# Patient Record
Sex: MALE | Race: BLACK OR AFRICAN AMERICAN | Employment: OTHER | URBAN - METROPOLITAN AREA
[De-identification: names, ages, dates, MRNs, and addresses within clinical notes are randomized per-mention and may not be internally consistent; named-entity substitution may affect disease eponyms.]

---

## 2017-01-04 ENCOUNTER — HOSPITAL ENCOUNTER (OUTPATIENT)
Dept: CT IMAGING | Age: 67
Discharge: HOME | End: 2017-01-04
Attending: FAMILY MEDICINE
Payer: COMMERCIAL

## 2017-01-04 DIAGNOSIS — G45.0 TIA INVOLVING VERTEBRAL ARTERY: ICD-10-CM

## 2017-01-04 PROCEDURE — 70450 CT HEAD/BRAIN W/O DYE: CPT

## 2017-08-24 ENCOUNTER — TELEPHONE (OUTPATIENT)
Dept: MRI IMAGING | Age: 67
End: 2017-08-24

## 2017-10-24 ENCOUNTER — ANESTHESIA EVENT (OUTPATIENT)
Dept: SURGERY | Age: 67
End: 2017-10-24
Payer: COMMERCIAL

## 2017-10-24 NOTE — PERIOP NOTES
[facility]  Ambulatory Surgery Unit  Pre-operative Instructions for Endo Procedures    Procedure Date  10/25/17            Tentative Arrival Time 0800      1. On the day of your procedure, please report to the Ambulatory Surgery Unit Registration Desk and sign in at your designated time. The Ambulatory Surgery Unit is located in [facility] on the [facility] side of the [facility] across from the [facility]. Please have all of your health insurance cards and a photo ID. 2. You must have someone with you to drive you home, as you should not drive a car for 24 hours following anesthesia. Please make arrangements for a responsible adult friend or family member to stay with you for at least the first 24 hours after your procedure. 3. Do not have anything to eat or drink (including water, gum, mints, coffee, juice) after midnight   10/24/17. This may not apply to medications prescribed by your physician. (Please note below the special instructions with medications to take the morning of your procedure.)    4. If applicable, follow the clear liquid diet and bowel prep instructions provided by your physician's office. If you do not have this information, or have any questions, please contact your physician's office. 5. We recommend you do not drink any alcoholic beverages for 24 hours before and after your procedure. 6. Contact your surgeons office for instructions on the following medications: non-steroidal anti-inflammatory drugs (i.e. Advil, Aleve), vitamins, and supplements. (Some surgeons will want you to stop these medications prior to surgery and others may allow you to take them)   **If you are currently taking Plavix, Coumadin, Aspirin and/or other blood-thinning agents, contact your surgeon for instructions. ** Your surgeon will partner with the physician prescribing these medications to determine if it is safe to stop or if you need to continue taking.  Please do not stop taking these medications without instructions from your surgeon. 7. In an effort to help prevent surgical site infection, we ask that you shower with an anti-bacterial soap (i.e. Dial or Safeguard) on the morning of your procedure. Do not apply any lotions, powders, or deodorants after showering. 8. Wear comfortable clothes. Wear glasses instead of contacts. Do not bring any jewelry or money (other than copays or fees as instructed). Do not wear make-up, particularly mascara, the morning of your procedure. Wear your hair loose or down, no ponytails, buns, anup pins or clips. All body piercings must be removed. 9. You should understand that if you do not follow these instructions your procedure may be cancelled. If your physical condition changes (i.e. fever, cold or flu) please contact your surgeon as soon as possible. 10. It is important that you be on time. If a situation occurs where you may be late, or if you have any questions or problems, please call [phone]. 11. Your procedure time may be subject to change. You will receive a phone call the day prior to confirm your arrival time. Special Instructions: Take all medications and inhalers, as prescribed, on the morning of surgery with a sip of water EXCEPT: none      I understand a pre-operative phone call will be made to verify my procedure time. In the event that I am not available, I give permission for a message to be left on my answering service and/or with another person?       Yes     (instructions given verbally during phone assessment- pt voiced understanding)     ___________________      ___________________      ___________________  (Signature of Patient)          (Witness)                   (Date and Time)

## 2017-10-25 ENCOUNTER — ANESTHESIA (OUTPATIENT)
Dept: SURGERY | Age: 67
End: 2017-10-25
Payer: COMMERCIAL

## 2017-10-25 ENCOUNTER — HOSPITAL ENCOUNTER (OUTPATIENT)
Age: 67
Setting detail: OUTPATIENT SURGERY
Discharge: HOME | End: 2017-10-25
Attending: COLON & RECTAL SURGERY | Admitting: COLON & RECTAL SURGERY
Payer: COMMERCIAL

## 2017-10-25 VITALS
SYSTOLIC BLOOD PRESSURE: 124 MMHG | TEMPERATURE: 97.8 F | OXYGEN SATURATION: 97 % | BODY MASS INDEX: 24.64 KG/M2 | HEART RATE: 81 BPM | HEIGHT: 71 IN | DIASTOLIC BLOOD PRESSURE: 86 MMHG | WEIGHT: 176 LBS | RESPIRATION RATE: 25 BRPM

## 2017-10-25 PROBLEM — Z12.11 ENCOUNTER FOR SCREENING COLONOSCOPY: Status: ACTIVE | Noted: 2017-10-25

## 2017-10-25 PROCEDURE — 74011000250 HC RX REV CODE- 250

## 2017-10-25 PROCEDURE — 77030009426 HC FCPS BIOP ENDOSC BSC -B: Performed by: COLON & RECTAL SURGERY

## 2017-10-25 PROCEDURE — 76210000040 HC AMBSU PH I REC FIRST 0.5 HR: Performed by: COLON & RECTAL SURGERY

## 2017-10-25 PROCEDURE — 88305 TISSUE EXAM BY PATHOLOGIST: CPT | Performed by: COLON & RECTAL SURGERY

## 2017-10-25 PROCEDURE — 77030020255 HC SOL INJ LR 1000ML BG: Performed by: COLON & RECTAL SURGERY

## 2017-10-25 PROCEDURE — 76030000002 HC AMB SURG OR TIME FIRST 0.: Performed by: COLON & RECTAL SURGERY

## 2017-10-25 PROCEDURE — 76210000046 HC AMBSU PH II REC FIRST 0.5 HR: Performed by: COLON & RECTAL SURGERY

## 2017-10-25 PROCEDURE — 74011250636 HC RX REV CODE- 250/636

## 2017-10-25 PROCEDURE — 76060000073 HC AMB SURG ANES FIRST 0.5 HR: Performed by: COLON & RECTAL SURGERY

## 2017-10-25 PROCEDURE — 74011250636 HC RX REV CODE- 250/636: Performed by: ANESTHESIOLOGY

## 2017-10-25 RX ORDER — SODIUM CHLORIDE, SODIUM LACTATE, POTASSIUM CHLORIDE, CALCIUM CHLORIDE 600; 310; 30; 20 MG/100ML; MG/100ML; MG/100ML; MG/100ML
25 INJECTION, SOLUTION INTRAVENOUS CONTINUOUS
Status: DISCONTINUED | OUTPATIENT
Start: 2017-10-25 | End: 2017-10-25 | Stop reason: HOSPADM

## 2017-10-25 RX ORDER — DIPHENHYDRAMINE HYDROCHLORIDE 50 MG/ML
12.5 INJECTION, SOLUTION INTRAMUSCULAR; INTRAVENOUS AS NEEDED
Status: DISCONTINUED | OUTPATIENT
Start: 2017-10-25 | End: 2017-10-25 | Stop reason: HOSPADM

## 2017-10-25 RX ORDER — FENTANYL CITRATE 50 UG/ML
25 INJECTION, SOLUTION INTRAMUSCULAR; INTRAVENOUS
Status: DISCONTINUED | OUTPATIENT
Start: 2017-10-25 | End: 2017-10-25 | Stop reason: HOSPADM

## 2017-10-25 RX ORDER — PROPOFOL 10 MG/ML
INJECTION, EMULSION INTRAVENOUS AS NEEDED
Status: DISCONTINUED | OUTPATIENT
Start: 2017-10-25 | End: 2017-10-25 | Stop reason: HOSPADM

## 2017-10-25 RX ORDER — SODIUM CHLORIDE 0.9 % (FLUSH) 0.9 %
5-10 SYRINGE (ML) INJECTION AS NEEDED
Status: DISCONTINUED | OUTPATIENT
Start: 2017-10-25 | End: 2017-10-25 | Stop reason: HOSPADM

## 2017-10-25 RX ORDER — LIDOCAINE HYDROCHLORIDE 10 MG/ML
0.1 INJECTION, SOLUTION EPIDURAL; INFILTRATION; INTRACAUDAL; PERINEURAL AS NEEDED
Status: DISCONTINUED | OUTPATIENT
Start: 2017-10-25 | End: 2017-10-25 | Stop reason: HOSPADM

## 2017-10-25 RX ORDER — ONDANSETRON 2 MG/ML
4 INJECTION INTRAMUSCULAR; INTRAVENOUS AS NEEDED
Status: DISCONTINUED | OUTPATIENT
Start: 2017-10-25 | End: 2017-10-25 | Stop reason: HOSPADM

## 2017-10-25 RX ORDER — SODIUM CHLORIDE 0.9 % (FLUSH) 0.9 %
5-10 SYRINGE (ML) INJECTION EVERY 8 HOURS
Status: DISCONTINUED | OUTPATIENT
Start: 2017-10-25 | End: 2017-10-25 | Stop reason: HOSPADM

## 2017-10-25 RX ORDER — LIDOCAINE HYDROCHLORIDE 20 MG/ML
INJECTION, SOLUTION EPIDURAL; INFILTRATION; INTRACAUDAL; PERINEURAL AS NEEDED
Status: DISCONTINUED | OUTPATIENT
Start: 2017-10-25 | End: 2017-10-25 | Stop reason: HOSPADM

## 2017-10-25 RX ADMIN — PROPOFOL 20 MG: 10 INJECTION, EMULSION INTRAVENOUS at 09:39

## 2017-10-25 RX ADMIN — PROPOFOL 40 MG: 10 INJECTION, EMULSION INTRAVENOUS at 09:23

## 2017-10-25 RX ADMIN — SODIUM CHLORIDE, SODIUM LACTATE, POTASSIUM CHLORIDE, AND CALCIUM CHLORIDE 25 ML/HR: 600; 310; 30; 20 INJECTION, SOLUTION INTRAVENOUS at 08:45

## 2017-10-25 RX ADMIN — PROPOFOL 20 MG: 10 INJECTION, EMULSION INTRAVENOUS at 09:35

## 2017-10-25 RX ADMIN — PROPOFOL 20 MG: 10 INJECTION, EMULSION INTRAVENOUS at 09:32

## 2017-10-25 RX ADMIN — PROPOFOL 40 MG: 10 INJECTION, EMULSION INTRAVENOUS at 09:24

## 2017-10-25 RX ADMIN — LIDOCAINE HYDROCHLORIDE 40 MG: 20 INJECTION, SOLUTION EPIDURAL; INFILTRATION; INTRACAUDAL; PERINEURAL at 09:23

## 2017-10-25 RX ADMIN — PROPOFOL 20 MG: 10 INJECTION, EMULSION INTRAVENOUS at 09:29

## 2017-10-25 RX ADMIN — PROPOFOL 20 MG: 10 INJECTION, EMULSION INTRAVENOUS at 09:26

## 2017-10-25 NOTE — PERIOP NOTES
Endoscope was pre-cleaned at bedside immediately by Clifford Umana.          Patient: Ina Apley, Dr. MRN: [id number]  SSN: [id number]   YOB: 1950  Age: 69 y.o. Sex: male     Patient is status post Procedure(s):  COLONOSCOPY  COLON BIOPSY. Surgeon(s) and Role:     * Jake Brown MD - Primary                  Peripheral IV 10/25/17 Right Forearm (Active)   Site Assessment Clean, dry, & intact 10/25/2017  8:42 AM   Phlebitis Assessment 0 10/25/2017  8:42 AM   Infiltration Assessment 0 10/25/2017  8:42 AM   Dressing Status Clean, dry, & intact 10/25/2017  8:42 AM   Dressing Type Tape;Transparent 10/25/2017  8:42 AM   Hub Color/Line Status Blue; Infusing 10/25/2017  8:42 AM

## 2017-10-25 NOTE — ANESTHESIA POSTPROCEDURE EVALUATION
Post-Anesthesia Evaluation and Assessment    Patient: Pandora Fleischer, Dr. MRN: [id number]  SSN: [id number]    YOB: 1950  Age: 69 y.o. Sex: male       Cardiovascular Function/Vital Signs  Visit Vitals    /86    Pulse 81    Temp 36.6 °C (97.8 °F)    Resp 25    Ht 5' 10.5\" (1.791 m)    Wt 79.8 kg (176 lb)    SpO2 97%    BMI 24.9 kg/m2       Patient is status post general, total IV anesthesia anesthesia for Procedure(s):  COLONOSCOPY  COLON BIOPSY. Nausea/Vomiting: None    Postoperative hydration reviewed and adequate. Pain:  Pain Scale 1: Numeric (0 - 10) (10/25/17 1001)  Pain Intensity 1: 0 (10/25/17 1001)   Managed    Neurological Status:   Neuro (WDL): Within Defined Limits (10/25/17 0946)   At baseline    Mental Status and Level of Consciousness: Arousable    Pulmonary Status:   O2 Device: Room air (10/25/17 0947)   Adequate oxygenation and airway patent    Complications related to anesthesia: None    Post-anesthesia assessment completed.  No concerns    Signed By: Cori Pal MD     October 25, 2017

## 2017-10-25 NOTE — DISCHARGE INSTRUCTIONS
Diane Ortiz Dr.  [id number]  1950    COLON DISCHARGE INSTRUCTIONS  Discomfort:  Redness at IV site- apply warm compress to area; if redness or soreness persist- contact your physician  There may be a slight amount of blood passed from the rectum  Gaseous discomfort- walking, belching will help relieve any discomfort  You may not operate a vehicle for 12 hours  You may not engage in an occupation involving machinery or appliances for rest of today  You may not drink alcoholic beverages for at least 12 hours  Avoid making any critical decisions for at least 24 hour  DIET:   Regular diet. - however -  remember your colon is empty and a heavy meal will produce gas. Avoid these foods:  vegetables, fried / greasy foods, carbonated drinks for today     ACTIVITY:  You may not  resume your normal daily activities it is recommended that you spend the remainder of the day resting -  avoid any strenuous activity. CALL M.D. ANY SIGN OF:   Increasing pain, nausea, vomiting  Abdominal distension (swelling)  New increased bleeding (oral or rectal)  Fever (chills)  Pain in chest area  Bloody discharge from nose or mouth  Shortness of breath    You may  take any Advil, Aspirin, Ibuprofen, Motrin, Aleve, or Goodys or  Tylenol as needed for pain. Post procedure diagnosis:  MILD SIGMOID STRICTURE, TORRES COLITIS  Follow-up Instructions:   Call Dr. Barbara Chatman if any questions. Telephone #  [phone]. Additional instructions ;  followup c-scope in 3 years. DO NOT TAKE SLEEPING MEDICATIONS OR ANTIANXIETY MEDICATIONS WHILE TAKING NARCOTIC PAIN MEDICATIONS,  ESPECIALLY THE NIGHT OF ANESTHESIA. CPAP PATIENTS BE SURE TO WEAR MACHINE WHENEVER NAPPING OR SLEEPING.     DISCHARGE SUMMARY from Nurse    The following personal items collected during your admission are returned to you:   Dental Appliance: Dental Appliances: None  Vision: Visual Aid: Contacts (pt. will remove)  Hearing Aid:    Jewelry:    Clothing: Other Valuables:    Valuables sent to safe:        PATIENT INSTRUCTIONS:    After General Anesthesia or Intravenous Sedation, for 24 hours or while taking prescription Narcotics:        Someone should be with you for the next 24 hours. For your own safety, a responsible adult must drive you home. · Limit your activities  · Recommended activity: Rest today, up with assistance today. Do not climb stairs or shower unattended for the next 24 hours. · Please start with a soft bland diet and advance as tolerated (no nausea) to regular diet. · If you have a sore throat you should try the following: fluids, warm salt water gargles, or throat lozenges. If it does not improve after several days please follow up with your primary physician. · Do not drive and operate hazardous machinery  · Do not make important personal or business decisions  · Do  not drink alcoholic beverages  · If you have not urinated within 8 hours after discharge, please contact your surgeon on call. Report the following to your surgeon:  · Excessive pain, swelling, redness or odor of or around the surgical area  · Temperature over 100.5  · Nausea and vomiting lasting longer than 4 hours or if unable to take medications  · Any signs of decreased circulation or nerve impairment to extremity: change in color, persistent  numbness, tingling, coldness or increase pain      · You will receive a Post Operative Call from one of the Recovery Room Nurses on the day after your surgery to check on you. It is very important for us to know how you are recovering after your surgery. If you have an issue or need to speak with someone, please call your surgeon, do not wait for the post operative call. · You may receive an e-mail or letter in the mail from [place] regarding your experience with us in the Ambulatory Surgery Unit. Your feedback is valuable to us and we appreciate your participation in the survey.        · If the above instructions are not adequate, please contact Abhilash Flores RN, Terri anesthesia Nurse Manager or our Anesthesiologist, at [phone]. If you are having problems after your surgery, call the physician at his office number. · We wish you a speedy recovery ? What to do at Home:      *  Please give a list of your current medications to your Primary Care Provider. *  Please update this list whenever your medications are discontinued, doses are      changed, or new medications (including over-the-counter products) are added. *  Please carry medication information at all times in case of emergency situations. These are general instructions for a healthy lifestyle:    No smoking/ No tobacco products/ Avoid exposure to second hand smoke    Surgeon General's Warning:  Quitting smoking now greatly reduces serious risk to your health. Obesity, smoking, and sedentary lifestyle greatly increases your risk for illness    A healthy diet, regular physical exercise & weight monitoring are important for maintaining a healthy lifestyle    You may be retaining fluid if you have a history of heart failure or if you experience any of the following symptoms:  Weight gain of 3 pounds or more overnight or 5 pounds in a week, increased swelling in our hands or feet or shortness of breath while lying flat in bed. Please call your doctor as soon as you notice any of these symptoms; do not wait until your next office visit. Recognize signs and symptoms of STROKE:    B - Balance  E - Eyes    F-  Face looks uneven    A-  Arms unable to move or move even    S-  Speech slurred or non-existent    T-  Time-call 911 as soon as signs and symptoms begin-DO NOT go       Back to bed or wait to see if you get better-TIME IS BRAIN. If you have not received your influenza and/or pneumococcal vaccine, please follow up with your primary care physician. The discharge information has been reviewed with the patient and spouse.   The patient and spouse verbalized understanding.

## 2017-10-25 NOTE — IP AVS SNAPSHOT
[address] [facility] 
[phone] Patient: Hilda Castañeda Dr. 
MRN: [id number] MKU:[id number]/[id number] About your hospitalization You were admitted on:  October 25, 2017 You last received care in the:  [facility] PACU You were discharged on:  October 25, 2017 Why you were hospitalized Your primary diagnosis was:  Encounter For Screening Colonoscopy Things You Need To Do (next 8 weeks) Follow up with Meghana Kaur MD  
  
Phone:  [phone] Where:  [address], [address] [id number] Discharge Orders None A check raúl indicates which time of day the medication should be taken. My Medications TAKE these medications as instructed Instructions Each Dose to Equal  
 Morning Noon Evening Bedtime  
 acyclovir 400 mg tablet Commonly known as:  ZOVIRAX Your last dose was: Your next dose is: Take 400 mg by mouth three (3) times daily as needed. 400 mg  
    
   
   
   
  
 amLODIPine 5 mg tablet Commonly known as:  Terri Radha Your last dose was: Your next dose is: Take 5 mg by mouth every morning. Indications: HYPERTENSION  
 5 mg  
    
   
   
   
  
 ferrous sulfate 325 mg (65 mg iron) tablet Your last dose was: Your next dose is: Take  by mouth Daily (before breakfast). omeprazole 20 mg capsule Commonly known as:  PRILOSEC Your last dose was: Your next dose is: Take 20 mg by mouth two (2) times a day. 20 mg  
    
   
   
   
  
 SAW PALMETTO EXTRACT PO Your last dose was: Your next dose is: Take  by mouth daily. sildenafil 20 mg tablet Commonly known as:  REVATIO Your last dose was: Your next dose is: Take 20 mg by mouth as needed.   
 20 mg  
    
   
 TYLENOL EXTRA STRENGTH 500 mg tablet Generic drug:  acetaminophen Your last dose was: Your next dose is: Take  by mouth every six (6) hours as needed for Pain. VITAMIN C 500 mg tablet Generic drug:  ascorbic acid (vitamin C) Your last dose was: Your next dose is: Take 1,000 mg by mouth daily. 1000 mg Discharge Instructions Dawit Perez Dr. 
[id number] 
1950 COLON DISCHARGE INSTRUCTIONS Discomfort: 
Redness at IV site- apply warm compress to area; if redness or soreness persist- contact your physician There may be a slight amount of blood passed from the rectum Gaseous discomfort- walking, belching will help relieve any discomfort You may not operate a vehicle for 12 hours You may not engage in an occupation involving machinery or appliances for rest of today You may not drink alcoholic beverages for at least 12 hours Avoid making any critical decisions for at least 24 hour DIET: 
 Regular diet.  however -  remember your colon is empty and a heavy meal will produce gas. Avoid these foods:  vegetables, fried / greasy foods, carbonated drinks for today ACTIVITY: 
You may not  resume your normal daily activities it is recommended that you spend the remainder of the day resting -  avoid any strenuous activity. CALL M.D. ANY SIGN OF: Increasing pain, nausea, vomiting Abdominal distension (swelling) New increased bleeding (oral or rectal) Fever (chills) Pain in chest area Bloody discharge from nose or mouth Shortness of breath You may  take any Advil, Aspirin, Ibuprofen, Motrin, Aleve, or Goodys or  Tylenol as needed for pain. Post procedure diagnosis:  MILD SIGMOID STRICTURE, TORRES COLITIS Follow-up Instructions: 
 Call Dr. Giulia Quinn if any questions. Telephone #  [phone]. Additional instructions ;  followup c-scope in 3 years. DO NOT TAKE SLEEPING MEDICATIONS OR ANTIANXIETY MEDICATIONS WHILE TAKING NARCOTIC PAIN MEDICATIONS,  ESPECIALLY THE NIGHT OF ANESTHESIA. CPAP PATIENTS BE SURE TO WEAR MACHINE WHENEVER NAPPING OR SLEEPING. DISCHARGE SUMMARY from Nurse The following personal items collected during your admission are returned to you:  
Dental Appliance: Dental Appliances: None Vision: Visual Aid: Contacts (pt. will remove) Hearing Aid:   
Jewelry:   
Clothing:   
Other Valuables:   
Valuables sent to safe:   
 
 
PATIENT INSTRUCTIONS: 
 
 
B - Balance E - Eyes F-  Face looks uneven A-  Arms unable to move or move even S-  Speech slurred or non-existent T-  Time-call 911 as soon as signs and symptoms begin-DO NOT go Back to bed or wait to see if you get better-TIME IS BRAIN. If you have not received your influenza and/or pneumococcal vaccine, please follow up with your primary care physician. The discharge information has been reviewed with the {PATIENT PARENT GUARDIAN:03790}. The {PATIENT PARENT GUARDIAN:87828} verbalized understanding. Introducing [facility]! Dear Judy Houston: Thank you for requesting a [employer] account. Our records indicate that you already have an active [employer] account. You can access your account anytime at [email or web address] Did you know that you can access your hospital and ER discharge instructions at any time in [employer]? You can also review all of your test results from your hospital stay or ER visit. Additional Information If you have questions, please visit the Frequently Asked Questions section of the [employer] website at [email or web address] Remember, [employer] is NOT to be used for urgent needs. For medical emergencies, dial 911. Now available from your iPhone and Android! Providers Seen During Your Hospitalization Provider Specialty Primary office phone Mary Vo MD Colon and Rectal Surgery [phone] Your Primary Care Physician (PCP) Primary Care Physician Office Phone Office Fax Monse Bartholomew [phone] You are allergic to the following Allergen Reactions Shellfish Derived Anaphylaxis Recent Documentation Height Weight BMI Smoking Status 1.791 m 79.8 kg 24.9 kg/m2 Never Smoker Emergency Contacts Name Discharge Info Relation Home Work Mobile Justa,Crystal DISCHARGE CAREGIVER [3] Spouse [3]   [phone] Patient Belongings The following personal items are in your possession at time of discharge: 
  Dental Appliances: None  Visual Aid: Contacts (pt. will remove) Please provide this summary of care documentation to your next provider. Signatures-by signing, you are acknowledging that this After Visit Summary has been reviewed with you and you have received a copy. Patient Signature:  ____________________________________________________________ Date:  ____________________________________________________________  
  
[facility] Provider Signature:  ____________________________________________________________ Date:  ____________________________________________________________

## 2017-10-25 NOTE — BRIEF OP NOTE
BRIEF OPERATIVE NOTE    Date of Procedure: 10/25/2017   Preoperative Diagnosis: SCREENING  Postoperative Diagnosis: MILD SIGMOID STRICTURE, PAN COLITIS    Procedure(s):  COLONOSCOPY  COLON BIOPSY  Surgeon(s) and Role:     * Lisa Cassidy MD - Primary         Assistant Staff:       Surgical Staff:  Circ-1: Basilio Ibarra RN  Circ-2: Marya Rocha RN  Scrub Tech-1: Josephine Salinas  Event Time In   Incision Start 0872   Incision Close 4634     Anesthesia: MAC   Estimated Blood Loss: none  Specimens:   ID Type Source Tests Collected by Time Destination   1 : TRANSVERSE COLON BIOPSY Preservative Colon, Transverse  Lisa Cassidy MD 10/25/2017 0935 Pathology   2 : DESCENDING COLON BIOPSY Preservative Colon, Descending  Lisa Cassidy MD 10/25/2017 3429 Pathology   3 : SIGMOID STRICTURE BIOPSY Preservative Sigmoid  Lisa Cassidy MD 10/25/2017 9475 Pathology   4 : RECTUM BIOPSY Preservative Rectum  Lisa Cassidy MD 10/25/2017 0940 Pathology      Findings: pancolitis;  Mild sigmoid stricture (biopsied);  Random pancolonic biopsies taken; No polyps or masses seen.    Complications: none  Implants: * No implants in log *

## 2017-10-25 NOTE — OP NOTES
[address], [address]   OP NOTE       Name:  Theresa Polk   MR#:  [id number]   :  1950   Account #:  [de-identified]    Surgery Date:  10/25/2017   Date of Adm:  10/25/2017       PREOPERATIVE DIAGNOSIS: History of pancolitis, presumably    ulcerative colitis, now for a 3-year followup exam.    POSTOPERATIVE DIAGNOSES: History of pancolitis, presumably    ulcerative colitis, now for a 3-year followup exam with mild sigmoid   stricture and continued pancolitis. PROCEDURES PERFORMED: Total colonoscopy into terminal ileum   with multiple colonic biopsies. SURGEON: Arun Henry MD    ANESTHESIA: IV sedation with propofol per Anesthesia. ESTIMATED BLOOD LOSS: None. SPECIMENS REMOVED: Biopsies of the transverse left sigmoid   stricture and rectal areas of the colon. INDICATIONS: The patient is a 42-year-old gentleman who has a   history of ulcerative colitis. He is in for interval 3-year followup exam to   follow up on the progress of his disease. He has been noted to have   pancolitis for the last 3 years. DESCRIPTION OF PROCEDURE: After uneventful induction of IV   sedation, the patient was placed in left lateral position and the pediatric   180 stiffening scope passed through the colon to the terminal ileum   without undue difficulty. Position in the ileum was confirmed, and the   scope was slowly and carefully pulled back. There was pancolitis   throughout the entire exam with regard to the abdominal colon. The   terminal ileum looked good. There was not a significant backwash   ileitis. When we returned back to the ascending colon, the scope was   then gently and slowly pulled back and biopsies were obtained of the   transverse and cecal area in 1 specimen bottle simply for random   biopsies for dysplasia.  The colitis was no more severe in the right side   of the colon than it was in the transverse or descending side and there   was no evidence of any masses or gonzales malignancy. The scope was   pulled back beyond the transverse after biopsies were obtained in the   descending which also looked moderately inflamed. The biopsies were   obtained and the scope was then pulled back down through a mild   sigmoid stricture where biopsies were obtained once again, especially   in view of the stricture. Again, it should be noted that there was no   gonzales mucosal cancer that could be identified, but rather a granular fine   pattern ulcerative colitis throughout the entire colon. The scope was   pulled back down to the rectum, which was actually the least severely   inflamed but the entire colonic tract, but still was, of course, abnormal.   Biopsies were obtained here and then again, the exam was ended   with removal of the scope and aspiration of air. The patient tolerated   the procedure well. Again, no gonzales malignancy was seen. The exam   did not look as severe in terms of the colitis as when I last had the   opportunity of seeing him in 2014. He seems to be doing well   symptomatically going into this exam. We will await results of the   biopsies with interest and hope that there is not any dysplasia. He left   in satisfactory condition with a benign abdomen at the end of the exam   in endoscopy.         MD RAJINDER Lin / VINCE   D:  10/25/2017   09:56   T:  10/25/2017   11:39   Job #:  [id number]

## 2017-10-25 NOTE — PERIOP NOTES
Jodi Enciso Dr.  1950  [id number]    Situation:  Verbal report given from: FLORINA Van CRNA, RN  Procedure: Procedure(s):  COLONOSCOPY  COLON BIOPSY    Background:    Preoperative diagnosis: SCREENING    Postoperative diagnosis: MILD SIGMOID STRICTURE, TORRES COLITIS    :  Dr. Gurpreet Owens    Assistant(s): Circ-1: Jimmie Nunez RN  Circ-2: Dorothy Branch RN  Scrub Tech-1: Randy Vega    Specimens:   ID Type Source Tests Collected by Time Destination   1 : TRANSVERSE COLON BIOPSY Preservative Colon, Transverse  Srinivas Betancourt MD 10/25/2017 7011 Pathology   2 : DESCENDING COLON BIOPSY Preservative Colon, Descending  Srinivas Betancourt MD 10/25/2017 0912 Pathology   3 : SIGMOID STRICTURE BIOPSY Preservative Sigmoid  Srinivas Betancourt MD 10/25/2017 6160 Pathology   4 : RECTUM BIOPSY Preservative Rectum  Srinivas Betancourt MD 10/25/2017 0940 Pathology       Assessment:  Intra-procedure medications   Propofol 600 mg      Anesthesia gave intra-procedure sedation and medications, see anesthesia flow sheet     Intravenous fluids: LR@ KVO     Vital signs stable     Abdominal assessment: round and soft       Recommendation:    Permission to share finding with family or friend yes    All side rails up, bed in low position, wheels locked. Nurse at bedside.

## 2017-10-25 NOTE — INTERVAL H&P NOTE
H&P Update:  Nicky Nieves Dr. was seen and examined. History and physical has been reviewed. Significant clinical changes have occurred as noted:  ENT clear;  Neck supple;  Lungs clear to A and P;  Cor without failure; Abdomen benign;  Rectal pending c-scope; Extremities and neuro WNL.     Signed By: Fransico Ordaz MD     October 25, 2017 9:18 AM

## 2017-10-25 NOTE — H&P
[facility], [address]   HISTORY AND PHYSICAL       Name:  Rogerio Li   MR#:  [id number]   :  1950   Account #:  [de-identified]        Date of Adm:  10/25/2017       CHIEF COMPLAINT: History of ulcerative colitis, now for a followup   exam.    HISTORY: The patient is a 49-year-old gentleman with a history of   ulcerative colitis. His last exam was 3 years ago. He has pancolitis and   is in for a close followup. PAST MEDICAL HISTORY: He has a past history of ulcerative colitis,   as well as a history of some hypertension. MEDICATIONS: Include:   1. Amlodipine. 2. He is also on allopurinol, has a history of gout. 3. Omeprazole, he has a history of reflux. 4. Zyrtec for seasonal allergies. 5. Remicade. SOCIAL HISTORY: He is , rarely drinks, does not smoke. FAMILY HISTORY: Significant for stroke and heart disease. REVIEW OF SYSTEMS: Significant for loose stools and some frequent   diarrhea. He has some urinary frequency. He has some anemia and he   has some joint pains, otherwise his 10-system review is negative. PHYSICAL EXAMINATION: Pending. ASSESSMENT: A 49-year-old gentleman with history of pan-ulcerative   colitis in for a followup interval exam.    RECOMMENDATIONS: He is aware of the risks of the procedure,   including bleeding and perforation, and the risk of missing small   polyps. He is agreeable to all this nonetheless. Will move forward with   exam today.         MD RAJINDER Harrell / Carmelo Mora   D:  10/24/2017   22:19   T:  10/24/2017   22:53   Job #:  [id number]

## 2017-10-25 NOTE — ANESTHESIA PREPROCEDURE EVALUATION
Anesthetic History   No history of anesthetic complications            Review of Systems / Medical History  Patient summary reviewed, nursing notes reviewed and pertinent labs reviewed    Pulmonary  Within defined limits                 Neuro/Psych   Within defined limits           Cardiovascular    Hypertension        Dysrhythmias (SSS)       Exercise tolerance: >4 METS  Comments: negative Stress Test 2015   GI/Hepatic/Renal     GERD: well controlled    Renal disease (CKD Stage III)       Endo/Other        Arthritis and anemia (iron def)     Other Findings   Comments: Gout           Physical Exam    Airway  Mallampati: III  TM Distance: 4 - 6 cm  Neck ROM: normal range of motion   Mouth opening: Normal     Cardiovascular    Rhythm: regular  Rate: normal        Comments: Occasional ectopy Dental    Dentition: Bridges     Pulmonary  Breath sounds clear to auscultation               Abdominal  GI exam deferred       Other Findings            Anesthetic Plan    ASA: 2  Anesthesia type: general and total IV anesthesia          Induction: Intravenous  Anesthetic plan and risks discussed with: Patient

## 2018-10-02 ENCOUNTER — HOSPITAL ENCOUNTER (OUTPATIENT)
Dept: GENERAL RADIOLOGY | Age: 68
Discharge: HOME | End: 2018-10-02
Payer: COMMERCIAL

## 2018-10-02 DIAGNOSIS — R10.0 ACUTE ABDOMINAL PAIN SYNDROME: ICD-10-CM

## 2018-10-02 DIAGNOSIS — R19.8 TENESMUS (RECTAL): ICD-10-CM

## 2018-10-02 DIAGNOSIS — R14.0 BLOATED ABDOMEN: ICD-10-CM

## 2018-10-02 PROCEDURE — 74018 RADEX ABDOMEN 1 VIEW: CPT

## 2018-12-18 RX ORDER — SODIUM CHLORIDE 9 MG/ML
25 INJECTION, SOLUTION INTRAVENOUS CONTINUOUS
Status: DISCONTINUED | OUTPATIENT
Start: 2018-12-20 | End: 2018-12-20

## 2018-12-20 ENCOUNTER — HOSPITAL ENCOUNTER (OUTPATIENT)
Dept: INFUSION THERAPY | Age: 68
Discharge: HOME | End: 2018-12-20
Payer: COMMERCIAL

## 2018-12-20 VITALS
HEART RATE: 100 BPM | RESPIRATION RATE: 18 BRPM | DIASTOLIC BLOOD PRESSURE: 89 MMHG | OXYGEN SATURATION: 97 % | SYSTOLIC BLOOD PRESSURE: 142 MMHG | TEMPERATURE: 97.6 F

## 2018-12-20 PROCEDURE — 96365 THER/PROPH/DIAG IV INF INIT: CPT

## 2018-12-20 PROCEDURE — 74011000258 HC RX REV CODE- 258: Performed by: SPECIALIST

## 2018-12-20 PROCEDURE — 74011250636 HC RX REV CODE- 250/636: Performed by: SPECIALIST

## 2018-12-20 RX ORDER — SODIUM CHLORIDE 0.9 % (FLUSH) 0.9 %
10-40 SYRINGE (ML) INJECTION AS NEEDED
Status: ACTIVE | OUTPATIENT
Start: 2018-12-20 | End: 2018-12-21

## 2018-12-20 RX ORDER — SODIUM CHLORIDE 9 MG/ML
25 INJECTION, SOLUTION INTRAVENOUS CONTINUOUS
Status: DISPENSED | OUTPATIENT
Start: 2018-12-20 | End: 2018-12-21

## 2018-12-20 RX ADMIN — VEDOLIZUMAB 300 MG: 300 INJECTION, POWDER, LYOPHILIZED, FOR SOLUTION INTRAVENOUS at 13:55

## 2018-12-20 RX ADMIN — SODIUM CHLORIDE 25 ML/HR: 900 INJECTION, SOLUTION INTRAVENOUS at 13:55

## 2018-12-20 RX ADMIN — Medication 10 ML: at 13:50

## 2018-12-20 NOTE — PROGRESS NOTES
Pt arrived to [facility] ambulatory for Entyvio week 0 in no acute distress at 1340.  Assessment unremarkable except diarrhea. #24g PIV established in left forearm site without issue and positive blood return noted. Patient Vitals for the past 12 hrs:   Temp Pulse Resp BP SpO2   12/20/18 1433   18 142/89    12/20/18 1340 97.6 °F (36.4 °C) 100 18 (!) 146/96 97 %       The following medications administered:  NS @ KVO  Entyvio 300 mg IV over 30 minutes    Pt tolerated treatment well.  No adverse reaction noted. Reviewed possible side effects and given information on new medication. IV flushed per policy and removed, 2x2 and coban placed.  Pt discharged ambulatory in no acute distress at 1440, accompanied by self. Next appointment 1/3/19 @ 1500.

## 2019-01-03 ENCOUNTER — APPOINTMENT (OUTPATIENT)
Dept: INFUSION THERAPY | Age: 69
End: 2019-01-03
Payer: COMMERCIAL

## 2019-01-03 ENCOUNTER — HOSPITAL ENCOUNTER (OUTPATIENT)
Dept: INFUSION THERAPY | Age: 69
Discharge: HOME | End: 2019-01-03
Payer: COMMERCIAL

## 2019-01-03 VITALS
DIASTOLIC BLOOD PRESSURE: 91 MMHG | WEIGHT: 172 LBS | OXYGEN SATURATION: 98 % | HEART RATE: 91 BPM | BODY MASS INDEX: 24.33 KG/M2 | TEMPERATURE: 98 F | SYSTOLIC BLOOD PRESSURE: 145 MMHG | RESPIRATION RATE: 18 BRPM

## 2019-01-03 PROCEDURE — 74011250636 HC RX REV CODE- 250/636: Performed by: SPECIALIST

## 2019-01-03 PROCEDURE — 96365 THER/PROPH/DIAG IV INF INIT: CPT

## 2019-01-03 RX ADMIN — VEDOLIZUMAB 300 MG: 300 INJECTION, POWDER, LYOPHILIZED, FOR SOLUTION INTRAVENOUS at 15:56

## 2019-01-03 NOTE — PROGRESS NOTES
Outpatient Infusion Center Short Visit Progress Note 1500 Patient admitted to [facility] for [facility] ambulatory in stable condition. Assessment completed. No new concerns voiced. Patient Vitals for the past 12 hrs: 
 Temp Pulse Resp BP SpO2  
01/03/19 1459 98 °F (36.7 °C) 91 18 (!) 145/91 98 % Left ac 24 gauge PIV placed without difficulty with positive blood return. Medications: Entyvio IV Patient tolerated treatment well. Patient discharged from [facility] ambulatory in no distress at 1640.  Patient aware of next appointment scheduled for 1/31/2019 at 3:00pm.

## 2019-01-31 ENCOUNTER — HOSPITAL ENCOUNTER (OUTPATIENT)
Dept: INFUSION THERAPY | Age: 69
Discharge: HOME | End: 2019-01-31
Payer: COMMERCIAL

## 2019-01-31 ENCOUNTER — APPOINTMENT (OUTPATIENT)
Dept: INFUSION THERAPY | Age: 69
End: 2019-01-31
Payer: COMMERCIAL

## 2019-01-31 VITALS
OXYGEN SATURATION: 95 % | RESPIRATION RATE: 16 BRPM | HEART RATE: 86 BPM | TEMPERATURE: 97.5 F | DIASTOLIC BLOOD PRESSURE: 73 MMHG | SYSTOLIC BLOOD PRESSURE: 115 MMHG

## 2019-01-31 PROCEDURE — 96365 THER/PROPH/DIAG IV INF INIT: CPT

## 2019-01-31 PROCEDURE — 74011250636 HC RX REV CODE- 250/636: Performed by: SPECIALIST

## 2019-01-31 RX ORDER — SODIUM CHLORIDE 0.9 % (FLUSH) 0.9 %
10-40 SYRINGE (ML) INJECTION AS NEEDED
Status: ACTIVE | OUTPATIENT
Start: 2019-01-31 | End: 2019-02-01

## 2019-01-31 RX ORDER — SODIUM CHLORIDE 9 MG/ML
25 INJECTION, SOLUTION INTRAVENOUS AS NEEDED
Status: DISPENSED | OUTPATIENT
Start: 2019-01-31 | End: 2019-02-01

## 2019-01-31 RX ADMIN — Medication 10 ML: at 10:32

## 2019-01-31 RX ADMIN — VEDOLIZUMAB 300 MG: 300 INJECTION, POWDER, LYOPHILIZED, FOR SOLUTION INTRAVENOUS at 10:35

## 2019-01-31 RX ADMIN — SODIUM CHLORIDE 25 ML/HR: 900 INJECTION, SOLUTION INTRAVENOUS at 10:32

## 2019-01-31 RX ADMIN — Medication 10 ML: at 11:16

## 2019-01-31 NOTE — PROGRESS NOTES
Pt arrived to [facility] ambulatory in no acute distress at 1000 for [facility] Assessment unremarkable no complaints or concerns voiced. IV established in Left AC site WNL and positive blood return noted.   
 
Patient Vitals for the past 12 hrs: 
 Temp Pulse Resp BP SpO2  
01/31/19 1116  86 16 115/73   
01/31/19 1006 97.5 °F (36.4 °C) 87 18 129/81 95 % The following medications administered: 
NS [facility] Entyvio 300 mg IV over 30 min Pt tolerated treatment well. No adverse reactions noted.  IV flushed per policy and removed, 2x2 and coban placed.  Pt discharged ambulatory in no acute distress at 1120, accompanied by Self. Per pt his insurance only approved 3 treatments which he has received he has a follow up scheduled with Dr. Gillian Khan.

## 2019-01-31 NOTE — PROGRESS NOTES
Problem: Patient Education:  Go to Education Activity Goal: Patient/Family Education Outcome: Progressing Towards Goal 
Patient verbalized he is tolerating Entyvio well.

## 2019-03-28 ENCOUNTER — APPOINTMENT (OUTPATIENT)
Dept: INFUSION THERAPY | Age: 69
End: 2019-03-28

## 2020-03-27 ENCOUNTER — HOSPITAL ENCOUNTER (OUTPATIENT)
Dept: INFUSION THERAPY | Age: 70
Discharge: HOME | End: 2020-03-27
Payer: MEDICARE

## 2020-03-27 VITALS
WEIGHT: 163 LBS | RESPIRATION RATE: 18 BRPM | BODY MASS INDEX: 23.06 KG/M2 | DIASTOLIC BLOOD PRESSURE: 78 MMHG | HEART RATE: 90 BPM | TEMPERATURE: 99 F | SYSTOLIC BLOOD PRESSURE: 121 MMHG

## 2020-03-27 PROCEDURE — 74011250636 HC RX REV CODE- 250/636: Performed by: SPECIALIST

## 2020-03-27 PROCEDURE — 96365 THER/PROPH/DIAG IV INF INIT: CPT

## 2020-03-27 RX ORDER — SODIUM CHLORIDE 0.9 % (FLUSH) 0.9 %
10-40 SYRINGE (ML) INJECTION AS NEEDED
Status: DISCONTINUED | OUTPATIENT
Start: 2020-03-27 | End: 2020-03-28 | Stop reason: HOSPADM

## 2020-03-27 RX ADMIN — Medication 20 ML: at 15:10

## 2020-03-27 RX ADMIN — USTEKINUMAB 390 MG: 130 SOLUTION INTRAVENOUS at 14:04

## 2020-03-27 RX ADMIN — Medication 10 ML: at 13:13

## 2020-03-27 NOTE — PROGRESS NOTES
Pt arrived to [facility] ambulatory for Stelara IV in no acute distress at 1255.  Assessment unremarkable except abdominal pain, diarrhea, dry cough, occasional dyspnea with exertion, generalized weakness and fatigue. #24g PIV established in [facility] without issue and positive blood return noted. Patient Vitals for the past 12 hrs:   Temp Pulse Resp BP   03/27/20 1538  90 18 121/78   03/27/20 1507  90 18 121/76   03/27/20 1259 99 °F (37.2 °C) (!) 104 18 119/71       The following medications administered:  Stelara 390 mg IV over 1 hour    Pt tolerated treatment well.  No adverse reaction noted after monitoring patient for 30 minutes post infusion. IV flushed per policy and removed, 2x2 and coban placed.  Pt given discharge instructions/handout on new medication. Pt discharged ambulatory in no acute distress at 1540, accompanied by spouse. No further appointments scheduled, pt to receive medication at home.

## 2021-04-21 ENCOUNTER — TRANSCRIBE ORDER (OUTPATIENT)
Dept: SCHEDULING | Age: 71
End: 2021-04-21

## 2021-04-21 DIAGNOSIS — R19.7 DIARRHEA: ICD-10-CM

## 2021-04-21 DIAGNOSIS — R19.8 TENESMUS (RECTAL): Primary | ICD-10-CM

## 2021-04-21 DIAGNOSIS — K51.019 ULCERATIVE COLITIS, UNIVERSAL, UNSPECIFIED COMPLICATION (HCC): ICD-10-CM

## 2021-04-29 ENCOUNTER — HOSPITAL ENCOUNTER (OUTPATIENT)
Dept: MRI IMAGING | Age: 71
Discharge: HOME | End: 2021-04-29
Attending: SPECIALIST
Payer: COMMERCIAL

## 2021-04-29 ENCOUNTER — HOSPITAL ENCOUNTER (OUTPATIENT)
Dept: CT IMAGING | Age: 71
Discharge: HOME | End: 2021-04-29
Attending: SPECIALIST
Payer: COMMERCIAL

## 2021-04-29 VITALS — WEIGHT: 163 LBS | BODY MASS INDEX: 23.06 KG/M2

## 2021-04-29 DIAGNOSIS — R19.8 TENESMUS (RECTAL): ICD-10-CM

## 2021-04-29 DIAGNOSIS — K51.019 ULCERATIVE COLITIS, UNIVERSAL, UNSPECIFIED COMPLICATION (HCC): ICD-10-CM

## 2021-04-29 DIAGNOSIS — R19.7 DIARRHEA: ICD-10-CM

## 2021-04-29 LAB — CREAT BLD-MCNC: 1.6 MG/DL (ref 0.6–1.3)

## 2021-04-29 PROCEDURE — 74011000636 HC RX REV CODE- 636: Performed by: RADIOLOGY

## 2021-04-29 PROCEDURE — 82565 ASSAY OF CREATININE: CPT

## 2021-04-29 PROCEDURE — 74177 CT ABD & PELVIS W/CONTRAST: CPT

## 2021-04-29 RX ADMIN — IOPAMIDOL 100 ML: 755 INJECTION, SOLUTION INTRAVENOUS at 12:25

## 2021-06-16 ENCOUNTER — TRANSCRIBE ORDER (OUTPATIENT)
Dept: SCHEDULING | Age: 71
End: 2021-06-16

## 2021-06-16 DIAGNOSIS — K51.019: Primary | ICD-10-CM

## 2022-03-19 PROBLEM — Z12.11 ENCOUNTER FOR SCREENING COLONOSCOPY: Status: ACTIVE | Noted: 2017-10-25

## 2022-04-26 ENCOUNTER — TRANSCRIBE ORDER (OUTPATIENT)
Dept: SCHEDULING | Age: 72
End: 2022-04-26

## 2022-04-26 DIAGNOSIS — D50.9 IRON DEFICIENCY ANEMIA, UNSPECIFIED: ICD-10-CM

## 2022-04-26 DIAGNOSIS — Z79.52 LONG TERM (CURRENT) USE OF SYSTEMIC STEROIDS: ICD-10-CM

## 2022-04-26 DIAGNOSIS — R93.5 ABNORMAL CT OF THE ABDOMEN: ICD-10-CM

## 2022-04-26 DIAGNOSIS — Z79.899 LONG-TERM CURRENT USE OF USTEKINUMAB: ICD-10-CM

## 2022-04-26 DIAGNOSIS — K50.819 CROHN'S DISEASE OF SMALL AND LARGE INTESTINES WITH COMPLICATION (HCC): Primary | ICD-10-CM

## 2022-04-26 DIAGNOSIS — K62.4: ICD-10-CM

## 2022-05-10 ENCOUNTER — HOSPITAL ENCOUNTER (OUTPATIENT)
Dept: BONE DENSITY | Age: 72
Discharge: HOME | End: 2022-05-10
Attending: INTERNAL MEDICINE
Payer: COMMERCIAL

## 2022-05-10 ENCOUNTER — HOSPITAL ENCOUNTER (OUTPATIENT)
Dept: CT IMAGING | Age: 72
Discharge: HOME | End: 2022-05-10
Attending: INTERNAL MEDICINE
Payer: COMMERCIAL

## 2022-05-10 DIAGNOSIS — D50.9 IRON DEFICIENCY ANEMIA, UNSPECIFIED: ICD-10-CM

## 2022-05-10 DIAGNOSIS — K62.4: ICD-10-CM

## 2022-05-10 DIAGNOSIS — Z79.52 LONG TERM (CURRENT) USE OF SYSTEMIC STEROIDS: ICD-10-CM

## 2022-05-10 DIAGNOSIS — Z79.899 LONG-TERM CURRENT USE OF USTEKINUMAB: ICD-10-CM

## 2022-05-10 DIAGNOSIS — R93.5 ABNORMAL CT OF THE ABDOMEN: ICD-10-CM

## 2022-05-10 DIAGNOSIS — K50.819 CROHN'S DISEASE OF SMALL AND LARGE INTESTINES WITH COMPLICATION (HCC): ICD-10-CM

## 2022-05-10 PROCEDURE — 77080 DXA BONE DENSITY AXIAL: CPT

## 2022-05-10 PROCEDURE — 74177 CT ABD & PELVIS W/CONTRAST: CPT

## 2022-05-10 PROCEDURE — 74011000636 HC RX REV CODE- 636: Performed by: INTERNAL MEDICINE

## 2022-05-10 RX ADMIN — IOPAMIDOL 100 ML: 755 INJECTION, SOLUTION INTRAVENOUS at 13:20

## 2022-06-08 ENCOUNTER — HOSPITAL ENCOUNTER (OUTPATIENT)
Age: 72
Setting detail: OUTPATIENT SURGERY
Discharge: HOME | End: 2022-06-08
Attending: INTERNAL MEDICINE | Admitting: INTERNAL MEDICINE
Payer: COMMERCIAL

## 2022-06-08 ENCOUNTER — ANESTHESIA EVENT (OUTPATIENT)
Dept: ENDOSCOPY | Age: 72
End: 2022-06-08
Payer: COMMERCIAL

## 2022-06-08 ENCOUNTER — ANESTHESIA (OUTPATIENT)
Dept: ENDOSCOPY | Age: 72
End: 2022-06-08
Payer: COMMERCIAL

## 2022-06-08 VITALS
OXYGEN SATURATION: 97 % | RESPIRATION RATE: 18 BRPM | WEIGHT: 172 LBS | DIASTOLIC BLOOD PRESSURE: 74 MMHG | TEMPERATURE: 98 F | HEIGHT: 70 IN | HEART RATE: 73 BPM | SYSTOLIC BLOOD PRESSURE: 131 MMHG | BODY MASS INDEX: 24.62 KG/M2

## 2022-06-08 PROCEDURE — 77030019988 HC FCPS ENDOSC DISP BSC -B: Performed by: INTERNAL MEDICINE

## 2022-06-08 PROCEDURE — 74011250637 HC RX REV CODE- 250/637: Performed by: INTERNAL MEDICINE

## 2022-06-08 PROCEDURE — 88342 IMHCHEM/IMCYTCHM 1ST ANTB: CPT

## 2022-06-08 PROCEDURE — 77030021593 HC FCPS BIOP ENDOSC BSC -A: Performed by: INTERNAL MEDICINE

## 2022-06-08 PROCEDURE — 74011250636 HC RX REV CODE- 250/636: Performed by: INTERNAL MEDICINE

## 2022-06-08 PROCEDURE — 76060000032 HC ANESTHESIA 0.5 TO 1 HR: Performed by: INTERNAL MEDICINE

## 2022-06-08 PROCEDURE — 76040000007: Performed by: INTERNAL MEDICINE

## 2022-06-08 PROCEDURE — 74011250636 HC RX REV CODE- 250/636: Performed by: NURSE ANESTHETIST, CERTIFIED REGISTERED

## 2022-06-08 PROCEDURE — 2709999900 HC NON-CHARGEABLE SUPPLY: Performed by: INTERNAL MEDICINE

## 2022-06-08 PROCEDURE — 88305 TISSUE EXAM BY PATHOLOGIST: CPT

## 2022-06-08 PROCEDURE — 74011000250 HC RX REV CODE- 250: Performed by: NURSE ANESTHETIST, CERTIFIED REGISTERED

## 2022-06-08 RX ORDER — ATROPINE SULFATE 0.1 MG/ML
0.5 INJECTION INTRAVENOUS
Status: DISCONTINUED | OUTPATIENT
Start: 2022-06-08 | End: 2022-06-08 | Stop reason: HOSPADM

## 2022-06-08 RX ORDER — DEXTROMETHORPHAN/PSEUDOEPHED 2.5-7.5/.8
1.2 DROPS ORAL
Status: DISCONTINUED | OUTPATIENT
Start: 2022-06-08 | End: 2022-06-08 | Stop reason: HOSPADM

## 2022-06-08 RX ORDER — LIDOCAINE HYDROCHLORIDE 20 MG/ML
INJECTION, SOLUTION EPIDURAL; INFILTRATION; INTRACAUDAL; PERINEURAL AS NEEDED
Status: DISCONTINUED | OUTPATIENT
Start: 2022-06-08 | End: 2022-06-08 | Stop reason: HOSPADM

## 2022-06-08 RX ORDER — DIPHENHYDRAMINE HYDROCHLORIDE 50 MG/ML
50 INJECTION, SOLUTION INTRAMUSCULAR; INTRAVENOUS ONCE
Status: DISCONTINUED | OUTPATIENT
Start: 2022-06-08 | End: 2022-06-08 | Stop reason: HOSPADM

## 2022-06-08 RX ORDER — FLUMAZENIL 0.1 MG/ML
0.2 INJECTION INTRAVENOUS
Status: DISCONTINUED | OUTPATIENT
Start: 2022-06-08 | End: 2022-06-08 | Stop reason: HOSPADM

## 2022-06-08 RX ORDER — NALOXONE HYDROCHLORIDE 0.4 MG/ML
0.4 INJECTION, SOLUTION INTRAMUSCULAR; INTRAVENOUS; SUBCUTANEOUS
Status: DISCONTINUED | OUTPATIENT
Start: 2022-06-08 | End: 2022-06-08 | Stop reason: HOSPADM

## 2022-06-08 RX ORDER — SODIUM CHLORIDE 9 MG/ML
25 INJECTION, SOLUTION INTRAVENOUS CONTINUOUS
Status: DISCONTINUED | OUTPATIENT
Start: 2022-06-08 | End: 2022-06-08 | Stop reason: HOSPADM

## 2022-06-08 RX ORDER — SODIUM CHLORIDE 9 MG/ML
125 INJECTION, SOLUTION INTRAVENOUS CONTINUOUS
Status: DISCONTINUED | OUTPATIENT
Start: 2022-06-08 | End: 2022-06-08 | Stop reason: HOSPADM

## 2022-06-08 RX ORDER — PROPOFOL 10 MG/ML
INJECTION, EMULSION INTRAVENOUS AS NEEDED
Status: DISCONTINUED | OUTPATIENT
Start: 2022-06-08 | End: 2022-06-08 | Stop reason: HOSPADM

## 2022-06-08 RX ORDER — EPINEPHRINE 0.1 MG/ML
1 INJECTION INTRACARDIAC; INTRAVENOUS
Status: DISCONTINUED | OUTPATIENT
Start: 2022-06-08 | End: 2022-06-08 | Stop reason: HOSPADM

## 2022-06-08 RX ORDER — MIDAZOLAM HYDROCHLORIDE 1 MG/ML
.25-5 INJECTION, SOLUTION INTRAMUSCULAR; INTRAVENOUS
Status: DISCONTINUED | OUTPATIENT
Start: 2022-06-08 | End: 2022-06-08 | Stop reason: HOSPADM

## 2022-06-08 RX ORDER — PHENYLEPHRINE HCL IN 0.9% NACL 0.4MG/10ML
SYRINGE (ML) INTRAVENOUS AS NEEDED
Status: DISCONTINUED | OUTPATIENT
Start: 2022-06-08 | End: 2022-06-08 | Stop reason: HOSPADM

## 2022-06-08 RX ADMIN — Medication 40 MCG: at 12:37

## 2022-06-08 RX ADMIN — Medication 40 MCG: at 13:00

## 2022-06-08 RX ADMIN — PROPOFOL 20 MG: 10 INJECTION, EMULSION INTRAVENOUS at 12:38

## 2022-06-08 RX ADMIN — PROPOFOL 50 MG: 10 INJECTION, EMULSION INTRAVENOUS at 12:53

## 2022-06-08 RX ADMIN — LIDOCAINE HYDROCHLORIDE 100 MG: 20 INJECTION, SOLUTION EPIDURAL; INFILTRATION; INTRACAUDAL; PERINEURAL at 12:15

## 2022-06-08 RX ADMIN — Medication 40 MCG: at 12:51

## 2022-06-08 RX ADMIN — PROPOFOL 50 MG: 10 INJECTION, EMULSION INTRAVENOUS at 12:57

## 2022-06-08 RX ADMIN — SIMETHICONE 80 MG: 20 SUSPENSION/ DROPS ORAL at 12:31

## 2022-06-08 RX ADMIN — PROPOFOL 20 MG: 10 INJECTION, EMULSION INTRAVENOUS at 12:26

## 2022-06-08 RX ADMIN — SODIUM CHLORIDE 25 ML/HR: 9 INJECTION, SOLUTION INTRAVENOUS at 11:31

## 2022-06-08 RX ADMIN — PROPOFOL 20 MG: 10 INJECTION, EMULSION INTRAVENOUS at 12:35

## 2022-06-08 RX ADMIN — PROPOFOL 20 MG: 10 INJECTION, EMULSION INTRAVENOUS at 12:20

## 2022-06-08 RX ADMIN — PROPOFOL 20 MG: 10 INJECTION, EMULSION INTRAVENOUS at 12:45

## 2022-06-08 RX ADMIN — PROPOFOL 40 MG: 10 INJECTION, EMULSION INTRAVENOUS at 12:17

## 2022-06-08 RX ADMIN — PROPOFOL 20 MG: 10 INJECTION, EMULSION INTRAVENOUS at 12:29

## 2022-06-08 RX ADMIN — PROPOFOL 20 MG: 10 INJECTION, EMULSION INTRAVENOUS at 12:41

## 2022-06-08 RX ADMIN — PROPOFOL 20 MG: 10 INJECTION, EMULSION INTRAVENOUS at 12:23

## 2022-06-08 RX ADMIN — PROPOFOL 60 MG: 10 INJECTION, EMULSION INTRAVENOUS at 12:15

## 2022-06-08 RX ADMIN — PROPOFOL 20 MG: 10 INJECTION, EMULSION INTRAVENOUS at 12:31

## 2022-06-08 RX ADMIN — PROPOFOL 50 MG: 10 INJECTION, EMULSION INTRAVENOUS at 12:49

## 2022-06-08 RX ADMIN — PROPOFOL 20 MG: 10 INJECTION, EMULSION INTRAVENOUS at 12:33

## 2022-06-08 NOTE — PROCEDURES
Colonoscopy and EGD and enteroscopy Procedure Note      Indications:  see h/p     : Iris Almazan MD    Staff: Endoscopy Technician-1: Pierre Luz  Endoscopy RN-1: Chacho Mustafa    Referring Provider: Naresh Andres MD    Sedation:  MAC anesthesia Propofol    Procedure Details:  After informed consent was obtained with all risks and benefits of procedure explained and pre-operative exam completed, pt was placed in the left lateral decubitus position. Following sequential administration of sedation as per above, the gastroscope was inserted into the mouth and advanced under direct vision to jejunum 110cm past pylorus. A careful inspection was made as the gastroscope was withdrawn, including a retroflexed view of the proximal stomach; findings and interventions are described below. EGD Findings:  Esophagus: normal esophageal mucosa. EGJ at 38cm regular. 5cm hiatal hernia ending at 43cm without ulceration. Gastric retroflexion with hill-4 GEFL. Stomach: moderate erythema and congestion in antrum and incisura, biopsied x6 to r/o H pylori cold forceps minimal bleeding. Duodenum/jejunum: Unable to reach D3/D4 area of abnormal CT scan with EGD scope due to J-shaped stomach. Scope was exchanged for a pediatric colonoscope, reinserted via mouth guard, traversed esophagus and stomach and duodenum with ease. Scope was advanced to the hub of the scope ~110-120cm past the pylorus. Normal mucosa in jejunum which was biopsied cold forceps minimal bleeding to r/o Crohn's disease. D1, D2. D3, D4 normal, biopsied cold forceps minimal bleeding to r/o Crohn's disease. The bed was then turned and upon sequential sedation as per above, a digital rectal exam was performed per below. The Olympus videocolonoscope was inserted in the rectum and carefully advanced to the neoterminal ileum. The quality of preparation was satisfactory after extensive lavage and suctioning.  [place] Bowel Prep Score  n/a. The colonoscope was slowly withdrawn with careful evaluation between folds. Retroflexion in the rectum was performed. Colon Findings:   PARESH: unremarkable, no perianal disease. The entire colon had severe friable, loss of vascularity, erythematous colitis with few apthae <0.5mm without deep ulcerations, stenoses, masses. Unable to assess for dysplasia due to the severity of inflammation, no appreciable raised or polypoid lesions. It appeared tubular in nature. Many cold forcep biopsies were obtained of the rectum, descending, transverse colon, ascending colon. The cecum had no normal architecture, and the ICV was widely patent and even appeared to be an end-end anastomosis ~80cm and appeared healthy. The colitis extended up to the valve. Proximal to this was normal appearing terminal ileum examined 30cm which was biopsied cold forceps minimal bleeding. 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 If confirmed CD is SESCD of 28. For now I call this IBD-U vs severe Crohn's colitis. Specimens Removed:    ID Type Source Tests Collected by Time Destination   1 : Gastric BX Preservative Gastric  Herber Begum MD 6/8/2022 1221 Pathology   2 : Erleen Cecille BX Preservative Jejunum  Vane Lopez MD 6/8/2022 1229 Pathology   3 : Duodenum Bx Preservative Duodenum  Herber Begum MD 6/8/2022 1233 Pathology   4 : Terminal Ileum BX Preservative Ileum, Terminal  Vane Lpoez MD 6/8/2022 1242 Pathology   5 : Transverse Colon BX Preservative Colon Transverse  Vane Lopez MD 6/8/2022 1247 Pathology   6 : Descending Colon BX Preservative Colon, Descending  Vane Lopez MD 6/8/2022 1251 Pathology   7 : Sigmoid Colon BX Preservative Sigmoid  Vane Lopez MD 6/8/2022 1255 Pathology   8 : Rectum BX Preservative Rectum  Herber Begum MD 6/8/2022 1259 Pathology       Complications: None.      EBL:  minimal    Impression:    See Postoperative diagnosis above    Recommendations:   - If biopsies were obtained, await pathology. You should receive a letter within 2 weeks. - Resume normal medications. - discussed with wife      Discharge Disposition:  Home in the company of a  when able to ambulate.     Tammie Carlson MD  6/8/2022  1:04 PM

## 2022-06-08 NOTE — ANESTHESIA PREPROCEDURE EVALUATION
Anesthetic History   No history of anesthetic complications            Review of Systems / Medical History  Patient summary reviewed, nursing notes reviewed and pertinent labs reviewed    Pulmonary  Within defined limits                 Neuro/Psych   Within defined limits           Cardiovascular    Hypertension        Dysrhythmias (SSS)       Exercise tolerance: >4 METS  Comments: negative Stress Test 2015  Hx SSS     GI/Hepatic/Renal     GERD: well controlled    Renal disease (CKD Stage III): CRI      Comments: Hx Colitis  Hx colon polyps     Endo/Other        Arthritis and anemia (iron def)     Other Findings   Comments: Gout           Physical Exam    Airway  Mallampati: III  TM Distance: 4 - 6 cm  Neck ROM: normal range of motion   Mouth opening: Normal     Cardiovascular  Regular rate and rhythm,  S1 and S2 normal,  no murmur, click, rub, or gallop            Comments: Occasional ectopy Dental    Dentition: Bridges     Pulmonary  Breath sounds clear to auscultation               Abdominal  GI exam deferred       Other Findings            Anesthetic Plan    ASA: 2  Anesthesia type: total IV anesthesia          Induction: Intravenous  Anesthetic plan and risks discussed with: Patient

## 2022-06-08 NOTE — DISCHARGE INSTRUCTIONS
Sylvia Mcdowell  [id number]  1950    It was my pleasure seeing you for your procedure. You will also receive a summary report with the findings from this procedure and any further recommendations. If you had polyps removed or biopsies taken during your procedure, you will receive a separate letter from me within the next 2 weeks. If you don't receive this letter or if you have any questions, please call my office [phone]. Please take note of the post procedure instructions listed below. Best Wishes,    Dr. Durán Seminole    These instructions give you information on caring for yourself after your procedure. Call your doctor if you have any problems or questions after your procedure. HOME CARE  · Walk if you have belly cramping or gas. Walking will help get rid of the air and reduce the bloated feeling in your belly (abdomen). · Your IV site (where you received drugs) may be tender to touch. Place warm towels on the site; keep your arm up on two pillows if you have any swelling or soreness in the area. · You may shower. ACTIVITY:  · Take frequent rest periods and move at a slower pace for the next 24 hours. .  · You may resume your regular activity tomorrow if you are feeling back to normal.  · Do not drive or ride a bicycle for at least 24 hours (because of the medicine (anesthesia) used during the test). · Do not sign any important legal documents or use or operate any machinery for 24 hours  · Do not take sleeping medicines/nerve drugs for 24 hours unless the doctor tells you. · You can return to work/school tomorrow unless otherwise instructed. NUTRITION:  · Drink plenty of fluids to keep your pee (urine) clear or pale yellow  · Begin with a light meal and progress to your normal diet. Heavy or fried foods are harder to digest and may make you feel sick to your stomach (nauseated).   · Once you are feeling back to normal, you may resume your normal diet as instructed by your doctor. · Avoid alcoholic beverages for 24 hours or as instructed. IF YOU HAD BIOPSIES TAKEN OR POLYPS REMOVED DURING THE PROCEDURE:  · For the next 7 days, avoid all non-steroidal antiinflammatory medications such as Ibuprofen, Motrin, Advil, Alleve, Bree-seltzer, Goody's powder, BC powder. · If you do not have an heart condition that requires you to take a daily aspirin, you should avoid taking aspirin for 7 days. · Eat a soft diet for 24 hours. · Monitor your stools for any blood or dark black, tar-like, stools as this may be a sign of bleeding and if you see any blood, notify your doctor immediately. GET HELP RIGHT AWAY AND SEEK IMMEDIATE MEDICAL CARE IF:  · You have more than a spotting of blood in your stool. · You pass clumps of tissue (blood clots) or fill the toilet with blood. · Your belly is painfully swollen or puffy (abdominal distention). · You throw up (vomit). · You have a fever. · You have redness, pain or swelling at the IV site that last greater than two days. · You have abdominal pain or discomfort that is severe or gets worse throughout the day. Post-procedure diagnosis:  EGD:Small hiatal hernia, gastritis, Abnormal CT  COLON: UC vs Chron's, Abnormal CT, severe colitis    Post-procedure recommendations:          Learning About Coronavirus (COVID-19)  Coronavirus (COVID-19): Overview  What is coronavirus (PDRPB-17)? The coronavirus disease (COVID-19) is caused by a virus. It is an illness that was first found in [place], [place], in December 2019. It has since spread worldwide. The virus can cause fever, cough, and trouble breathing. In severe cases, it can cause pneumonia and make it hard to breathe without help. It can cause death. Coronaviruses are a large group of viruses. They cause the common cold.  They also cause more serious illnesses like Middle East respiratory syndrome (MERS) and severe acute respiratory syndrome (SARS). COVID-19 is caused by a novel coronavirus. That means it's a new type that has not been seen in people before. This virus spreads person-to-person through droplets from coughing and sneezing. It can also spread when you are close to someone who is infected. And it can spread when you touch something that has the virus on it, such as a doorknob or a tabletop. What can you do to protect yourself from coronavirus (COVID-19)? The best way to protect yourself from getting sick is to:  · Avoid areas where there is an outbreak. · Avoid contact with people who may be infected. · Wash your hands often with soap or alcohol-based hand sanitizers. · Avoid crowds and try to stay at least 6 feet away from other people. · Wash your hands often, especially after you cough or sneeze. Use soap and water, and scrub for at least 20 seconds. If soap and water aren't available, use an alcohol-based hand . · Avoid touching your mouth, nose, and eyes. What can you do to avoid spreading the virus to others? To help avoid spreading the virus to others:  · Cover your mouth with a tissue when you cough or sneeze. Then throw the tissue in the trash. · Use a disinfectant to clean things that you touch often. · Stay home if you are sick or have been exposed to the virus. Don't go to school, work, or public areas. And don't use public transportation. · If you are sick:  ? Leave your home only if you need to get medical care. But call the doctor's office first so they know you're coming. And wear a face mask, if you have one.  ? If you have a face mask, wear it whenever you're around other people. It can help stop the spread of the virus when you cough or sneeze. ? Clean and disinfect your home every day. Use household  and disinfectant wipes or sprays. Take special care to clean things that you grab with your hands.  These include doorknobs, remote controls, phones, and handles on your refrigerator and microwave. And don't forget countertops, tabletops, bathrooms, and computer keyboards. When to call for help  Call 911 anytime you think you may need emergency care. For example, call if:  · You have severe trouble breathing. (You can't talk at all.)  · You have constant chest pain or pressure. · You are severely dizzy or lightheaded. · You are confused or can't think clearly. · Your face and lips have a blue color. · You pass out (lose consciousness) or are very hard to wake up. Call your doctor now if you develop symptoms such as:  · Shortness of breath. · Fever. · Cough. If you need to get care, call ahead to the doctor's office for instructions before you go. Make sure you wear a face mask, if you have one, to prevent exposing other people to the virus. Where can you get the latest information? The following health organizations are tracking and studying this virus. Their websites contain the most up-to-date information. Elizabeth Christine also learn what to do if you think you may have been exposed to the virus. · U.S. Centers for Disease Control and Prevention (CDC): The CDC provides updated news about the disease and travel advice. The website also tells you how to prevent the spread of infection. www.cdc.gov  · World Health Organization [facility]): WHO offers information about the virus outbreaks. WHO also has travel advice. www.who.int  Current as of: April 1, 2020               Content Version: 12.4  © 1695-9504 [employer]. Care instructions adapted under license by your healthcare professional. If you have questions about a medical condition or this instruction, always ask your healthcare professional. [address] any warranty or liability for your use of this information.

## 2022-06-08 NOTE — PROGRESS NOTES
Endoscope was pre-cleaned at the bedside immediately following procedure by Shari Maradiaga. For medications administered by anesthesia, see anesthesia chart.

## 2022-06-08 NOTE — ROUTINE PROCESS
Jovan Grijalva  1950  [id number]    Situation:  Verbal report received from: Serge Gan RN  Procedure: Procedure(s):  COLONOSCOPY  ESOPHAGOGASTRODUODENOSCOPY (EGD)  ESOPHAGOGASTRODUODENAL (EGD) BIOPSY  COLON BIOPSY  ENTEROSCOPY    Background:    Preoperative diagnosis: ABDOMINAL PAIN, ABNORMAL CT, ANEMIA, BLOATING, CHECT PAIN, CROHNS, DIARRHEA, DYSPNEA, HYPERGLYCEMIA, HYPERKALEMINA, HYPERTRIGLYCERIDEMIA  Postoperative diagnosis: EGD:Small hiatal hernia, gastritis, Abnormal CT  COLON: UC vs Chron's, Abnormal CT, severe colitis    :  Dr. Chyna Cotter  Assistant(s): Endoscopy Technician-1: Kristopher Gregory  Endoscopy RN-1: Zhen Altamirano    Specimens:   ID Type Source Tests Collected by Time Destination   1 : Gastric BX Preservative Gastric  Theresa Flank, MD 6/8/2022 1221 Pathology   2 : Henny [place] BX Preservative Jejunum  Theresa Flank, MD 6/8/2022 1229 Pathology   3 : Duodenum Bx Preservative Duodenum  Theresa Flank, MD 6/8/2022 1233 Pathology   4 : Terminal Ileum BX Preservative Ileum, Terminal  Theresa Flank, MD 6/8/2022 1242 Pathology   5 : Transverse Colon BX Preservative Colon, Transverse  Theresa Flank, MD 6/8/2022 1247 Pathology   6 : Descending Colon BX Preservative Colon, Descending  Theresa Flank, MD 6/8/2022 1251 Pathology   7 : Sigmoid Colon BX Preservative Sigmoid  Theresa Flank, MD 6/8/2022 1255 Pathology   8 : Rectum BX Preservative Rectum  Theresa Flank, MD 6/8/2022 1259 Pathology     H. Pylori  no    Assessment:    Anesthesia gave intra-procedure sedation and medications, see anesthesia flow sheet yes    Intravenous fluids: NS@ KVO     Vital signs stable     Abdominal assessment: round and soft     Recommendation:  Discharge patient per MD order.   Family or Friend   Permission to share finding with family or friend yes

## 2022-06-08 NOTE — PROGRESS NOTES
Endoscope was pre-cleaned at the bedside immediately following procedure by Braeden Sun.     Endoscope and adult colonoscope (not used) cleaned and taken to scope room

## 2022-06-08 NOTE — ANESTHESIA POSTPROCEDURE EVALUATION
Procedure(s):  COLONOSCOPY  ESOPHAGOGASTRODUODENOSCOPY (EGD)  ESOPHAGOGASTRODUODENAL (EGD) BIOPSY  COLON BIOPSY  ENTEROSCOPY.    total IV anesthesia    Anesthesia Post Evaluation        Patient location during evaluation: PACU  Note status: Adequate. Level of consciousness: responsive to verbal stimuli and sleepy but conscious  Pain management: satisfactory to patient  Airway patency: patent  Anesthetic complications: no  Cardiovascular status: acceptable  Respiratory status: acceptable  Hydration status: acceptable  Comments: +Post-Anesthesia Evaluation and Assessment    Patient: John Saleem MRN: [id number]  SSN: [id number]   YOB: 1950  Age: 74 y.o. Sex: male      Cardiovascular Function/Vital Signs    BP 98/70   Pulse 74   Temp 36.3 °C (97.3 °F)   Resp 16   Ht 5' 10\" (1.778 m)   Wt 78 kg (172 lb)   SpO2 95%   BMI 24.68 kg/m²     Patient is status post Procedure(s):  COLONOSCOPY  ESOPHAGOGASTRODUODENOSCOPY (EGD)  ESOPHAGOGASTRODUODENAL (EGD) BIOPSY  COLON BIOPSY  ENTEROSCOPY. Nausea/Vomiting: Controlled. Postoperative hydration reviewed and adequate. Pain:  Pain Scale 1: Numeric (0 - 10) (06/08/22 1117)  Pain Intensity 1: 0 (06/08/22 1117)   Managed. Neurological Status: At baseline. Mental Status and Level of Consciousness: Arousable. Pulmonary Status:   O2 Device: Nasal cannula (06/08/22 1302)   Adequate oxygenation and airway patent. Complications related to anesthesia: None    Post-anesthesia assessment completed. No concerns. Signed By: Salo Parada MD    6/8/2022  Post anesthesia nausea and vomiting:  controlled      INITIAL Post-op Vital signs: No vitals data found for the desired time range.
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
## 2022-06-13 ENCOUNTER — TRANSCRIBE ORDER (OUTPATIENT)
Dept: SCHEDULING | Age: 72
End: 2022-06-13

## 2022-06-13 DIAGNOSIS — Z79.899 LONG-TERM CURRENT USE OF USTEKINUMAB: ICD-10-CM

## 2022-06-13 DIAGNOSIS — D50.9 IRON DEFICIENCY ANEMIA, UNSPECIFIED: ICD-10-CM

## 2022-06-13 DIAGNOSIS — R93.5 ABNORMAL CT OF THE ABDOMEN: ICD-10-CM

## 2022-06-13 DIAGNOSIS — K62.4: ICD-10-CM

## 2022-06-13 DIAGNOSIS — K50.819 CROHN'S DISEASE OF BOTH SMALL AND LARGE INTESTINE WITH COMPLICATION (HCC): Primary | ICD-10-CM

## 2022-06-13 DIAGNOSIS — Z79.52 LONG TERM (CURRENT) USE OF SYSTEMIC STEROIDS: ICD-10-CM

## 2022-11-03 RX ORDER — SODIUM CHLORIDE 9 MG/ML
25 INJECTION, SOLUTION INTRAVENOUS CONTINUOUS
Status: DISCONTINUED | OUTPATIENT
Start: 2022-11-10 | End: 2022-11-03

## 2022-11-10 ENCOUNTER — HOSPITAL ENCOUNTER (OUTPATIENT)
Dept: INFUSION THERAPY | Age: 72
Discharge: HOME | End: 2022-11-10
Payer: MEDICARE

## 2022-11-10 VITALS
TEMPERATURE: 98.2 F | HEART RATE: 88 BPM | SYSTOLIC BLOOD PRESSURE: 159 MMHG | HEIGHT: 70 IN | RESPIRATION RATE: 17 BRPM | OXYGEN SATURATION: 98 % | DIASTOLIC BLOOD PRESSURE: 89 MMHG | BODY MASS INDEX: 24.91 KG/M2 | WEIGHT: 174 LBS

## 2022-11-10 PROCEDURE — 74011250636 HC RX REV CODE- 250/636: Performed by: INTERNAL MEDICINE

## 2022-11-10 PROCEDURE — 96365 THER/PROPH/DIAG IV INF INIT: CPT

## 2022-11-10 RX ADMIN — FERRIC CARBOXYMALTOSE INJECTION 750 MG: 50 INJECTION, SOLUTION INTRAVENOUS at 14:59

## 2022-11-10 NOTE — PROGRESS NOTES
Outpatient Infusion Center Short Visit Progress Note    Mr. Marr admitted to [facility] ambulatory in stable condition. Assessment completed. No new concerns voiced. Right PIV established with blood return. Vital Signs:  Visit Vitals  BP (!) 159/89   Pulse 88   Temp 98.2 °F (36.8 °C)   Resp 17   Ht 5' 10\" (1.778 m)   Wt 78.9 kg (174 lb)   SpO2 98%   BMI 24.97 kg/m²       Medications:  Medications Administered       ferric carboxymaltose (INJECTAFER) 750 mg in 0.9% sodium chloride 250 mL, overfill volume 25 mL IVPB       Admin Date  11/10/2022 Action  New Bag Dose  750 mg Rate  870 mL/hr Route  IntraVENous Administered By  Laila CARREON    Patient tolerated treatment well. Patient discharged from [place] [address] ambulatory in no distress at 1530. Patient aware of next appointment.     Desire Caballero  November 10, 2022

## 2023-01-23 NOTE — PROGRESS NOTES
Problem: Knowledge Deficit Goal: *Verbalizes understanding of procedures and medications Outcome: Progressing Towards Goal 
[facility]

## 2024-02-15 ENCOUNTER — TRANSCRIBE ORDERS (OUTPATIENT)
Facility: HOSPITAL | Age: 74
End: 2024-02-15

## 2024-02-15 DIAGNOSIS — S46.312A: Primary | ICD-10-CM

## 2024-02-15 DIAGNOSIS — S52.022A CLOSED FRACTURE OF OLECRANON PROCESS OF LEFT ULNA, INITIAL ENCOUNTER: ICD-10-CM

## 2024-03-01 ENCOUNTER — HOSPITAL ENCOUNTER (OUTPATIENT)
Facility: HOSPITAL | Age: 74
End: 2024-03-01
Attending: ORTHOPAEDIC SURGERY
Payer: MEDICARE

## 2024-03-01 VITALS
DIASTOLIC BLOOD PRESSURE: 75 MMHG | OXYGEN SATURATION: 94 % | SYSTOLIC BLOOD PRESSURE: 138 MMHG | TEMPERATURE: 97.7 F | HEART RATE: 77 BPM | RESPIRATION RATE: 21 BRPM

## 2024-03-01 DIAGNOSIS — S52.022A CLOSED FRACTURE OF OLECRANON PROCESS OF LEFT ULNA, INITIAL ENCOUNTER: ICD-10-CM

## 2024-03-01 DIAGNOSIS — S46.312A: ICD-10-CM

## 2024-03-01 PROCEDURE — 99152 MOD SED SAME PHYS/QHP 5/>YRS: CPT

## 2024-03-01 PROCEDURE — 73221 MRI JOINT UPR EXTREM W/O DYE: CPT

## 2024-03-01 PROCEDURE — 6360000002 HC RX W HCPCS: Performed by: STUDENT IN AN ORGANIZED HEALTH CARE EDUCATION/TRAINING PROGRAM

## 2024-03-01 PROCEDURE — 99153 MOD SED SAME PHYS/QHP EA: CPT

## 2024-03-01 RX ORDER — FENTANYL CITRATE 50 UG/ML
100 INJECTION, SOLUTION INTRAMUSCULAR; INTRAVENOUS
Status: DISCONTINUED | OUTPATIENT
Start: 2024-03-01 | End: 2024-03-05 | Stop reason: HOSPADM

## 2024-03-01 RX ORDER — BUDESONIDE 3 MG/1
3 CAPSULE, COATED PELLETS ORAL 2 TIMES DAILY
COMMUNITY

## 2024-03-01 RX ORDER — MIDAZOLAM HYDROCHLORIDE 1 MG/ML
5 INJECTION INTRAMUSCULAR; INTRAVENOUS
Status: DISCONTINUED | OUTPATIENT
Start: 2024-03-01 | End: 2024-03-05 | Stop reason: HOSPADM

## 2024-03-01 RX ADMIN — MIDAZOLAM HYDROCHLORIDE 1 MG: 1 INJECTION, SOLUTION INTRAMUSCULAR; INTRAVENOUS at 10:11

## 2024-03-01 RX ADMIN — FENTANYL CITRATE 25 MCG: 50 INJECTION INTRAMUSCULAR; INTRAVENOUS at 10:11

## 2024-03-01 RX ADMIN — MIDAZOLAM HYDROCHLORIDE 1 MG: 1 INJECTION, SOLUTION INTRAMUSCULAR; INTRAVENOUS at 10:07

## 2024-03-01 RX ADMIN — FENTANYL CITRATE 25 MCG: 50 INJECTION INTRAMUSCULAR; INTRAVENOUS at 10:07
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
## 2024-03-01 NOTE — DISCHARGE INSTRUCTIONS
Thank you for allowing me to care for you today.  Your test results should be resulted in 3-5 days.    If you would like to speak with the Radiology Nurse from 7 am to 4 pm please call [phone].  After hours please call the main Radiology dept at [phone] to page the nurse on call.      Thank you again,     Dejah Negron RN

## 2024-03-01 NOTE — PROGRESS NOTES
0730 Met patient in [facility] confirmed identity with two idenitfyers, confirmed Npo status and /Wife present obtained contact information.    Performed physical assesment S1, S2, LCTA, VSS.  Reviewed procedure for MRI with sedation, reviewed MRI checklist with JENELLE Clayton.    JENELLE Clayton reviewed procedure with patient and asked if patient would consider open MRI this morning, this was suggested as the best quality of imaging and safer without sedation as patient will be positioned prone with arm outstretched.  Patient reported would try, informed patient wife that patient would be taken to open MRI ASAP this morning.    Dr Lazaro notified     0825 patient transported to open MRI, awaiting MRI table to be free.  Patient reportes he has a Prostate with contrast ordered, he requested to have this MRI performed with [facility] MRI.  Confirmed patient does have Prostate MRI order.  Will discuss with MRI tech. Prostate MRI performed at [facility] only.    Patient assisted to MRI table by Warren hazel.    Gave patient wife patient status update.    0900 Patient unable to withstand going in open MRI, patient transported back to  for MRI with sedation.  ANTONIO MARTINEZ present at bedside to consent for MRI of Left Elbow with sedation.  All question reviewed and all signed consent.    0950 Patient transported to MRI via stretcher, assisted to MRI table placed prone with Heart monitor, NIBP, SaO2, CO2, IVF's and O2, BNC.    Time out 1007  Start time 1007  Stop time 1045    Patient received 50 mcg Fentanyl and 2 mg Versed total.    1050 Patient awake with out complaitns or distress, assisted from [facility] to [facility], transported back to .  Taking food and fluids without difficulty.    1101 contacted patient wife gave status update and time of discharge. Reviewed discharge instructions with patient and gave paper copy.    1115 patioent oob to dress, assisted to W/C transported to [facility] to have wife drive home.    ANTONIO

## 2024-03-27 ENCOUNTER — TRANSCRIBE ORDERS (OUTPATIENT)
Facility: HOSPITAL | Age: 74
End: 2024-03-27

## 2024-03-27 DIAGNOSIS — Z95.5 STENTED CORONARY ARTERY: Primary | ICD-10-CM

## 2024-04-23 ENCOUNTER — HOSPITAL ENCOUNTER (OUTPATIENT)
Facility: HOSPITAL | Age: 74
Setting detail: RECURRING SERIES
Discharge: HOME | End: 2024-04-26
Payer: MEDICARE

## 2024-04-23 VITALS — BODY MASS INDEX: 25.54 KG/M2 | WEIGHT: 178 LBS

## 2024-04-23 PROCEDURE — 93798 PHYS/QHP OP CAR RHAB W/ECG: CPT

## 2024-04-23 PROCEDURE — 93797 PHYS/QHP OP CAR RHAB WO ECG: CPT

## 2024-04-23 ASSESSMENT — EXERCISE STRESS TEST
PEAK_RPE: 12
PEAK_BP: 132/80
PEAK_HR: 102
PEAK_BP: 132/80
PEAK_RPE: 12
PEAK_RPD: 0
PEAK_HR: 102
PEAK_METS: 2.9

## 2024-04-23 ASSESSMENT — PATIENT HEALTH QUESTIONNAIRE - PHQ9
4. FEELING TIRED OR HAVING LITTLE ENERGY: NOT AT ALL
3. TROUBLE FALLING OR STAYING ASLEEP: NOT AT ALL
7. TROUBLE CONCENTRATING ON THINGS, SUCH AS READING THE NEWSPAPER OR WATCHING TELEVISION: NOT AT ALL
8. MOVING OR SPEAKING SO SLOWLY THAT OTHER PEOPLE COULD HAVE NOTICED. OR THE OPPOSITE, BEING SO FIGETY OR RESTLESS THAT YOU HAVE BEEN MOVING AROUND A LOT MORE THAN USUAL: NOT AT ALL
9. THOUGHTS THAT YOU WOULD BE BETTER OFF DEAD, OR OF HURTING YOURSELF: NOT AT ALL
SUM OF ALL RESPONSES TO PHQ QUESTIONS 1-9: 0
10. IF YOU CHECKED OFF ANY PROBLEMS, HOW DIFFICULT HAVE THESE PROBLEMS MADE IT FOR YOU TO DO YOUR WORK, TAKE CARE OF THINGS AT HOME, OR GET ALONG WITH OTHER PEOPLE: NOT DIFFICULT AT ALL
6. FEELING BAD ABOUT YOURSELF - OR THAT YOU ARE A FAILURE OR HAVE LET YOURSELF OR YOUR FAMILY DOWN: NOT AT ALL
SUM OF ALL RESPONSES TO PHQ QUESTIONS 1-9: 0
1. LITTLE INTEREST OR PLEASURE IN DOING THINGS: NOT AT ALL
SUM OF ALL RESPONSES TO PHQ QUESTIONS 1-9: 0
5. POOR APPETITE OR OVEREATING: NOT AT ALL
2. FEELING DOWN, DEPRESSED OR HOPELESS: NOT AT ALL
SUM OF ALL RESPONSES TO PHQ QUESTIONS 1-9: 0
SUM OF ALL RESPONSES TO PHQ9 QUESTIONS 1 & 2: 0

## 2024-04-23 ASSESSMENT — LIFESTYLE VARIABLES: SMOKELESS_TOBACCO: NO
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
## 2024-04-30 ENCOUNTER — HOSPITAL ENCOUNTER (OUTPATIENT)
Facility: HOSPITAL | Age: 74
Setting detail: RECURRING SERIES
Discharge: HOME | End: 2024-05-03
Payer: MEDICARE

## 2024-04-30 VITALS — WEIGHT: 177.6 LBS | BODY MASS INDEX: 25.48 KG/M2

## 2024-04-30 PROCEDURE — 93798 PHYS/QHP OP CAR RHAB W/ECG: CPT

## 2024-04-30 ASSESSMENT — EXERCISE STRESS TEST
PEAK_METS: 2.9
PEAK_RPE: 12
PEAK_HR: 111

## 2024-05-02 ENCOUNTER — HOSPITAL ENCOUNTER (OUTPATIENT)
Facility: HOSPITAL | Age: 74
Setting detail: RECURRING SERIES
Discharge: HOME | End: 2024-05-05
Payer: MEDICARE

## 2024-05-02 VITALS — WEIGHT: 177.6 LBS | BODY MASS INDEX: 25.48 KG/M2

## 2024-05-02 PROCEDURE — 93798 PHYS/QHP OP CAR RHAB W/ECG: CPT

## 2024-05-02 ASSESSMENT — EXERCISE STRESS TEST
PEAK_METS: 2.9
PEAK_HR: 111
PEAK_RPE: 13

## 2024-05-07 ENCOUNTER — HOSPITAL ENCOUNTER (OUTPATIENT)
Facility: HOSPITAL | Age: 74
Setting detail: RECURRING SERIES
Discharge: HOME | End: 2024-05-10
Payer: MEDICARE

## 2024-05-07 VITALS — BODY MASS INDEX: 25.48 KG/M2 | WEIGHT: 177.6 LBS

## 2024-05-07 PROCEDURE — 93798 PHYS/QHP OP CAR RHAB W/ECG: CPT

## 2024-05-07 ASSESSMENT — EXERCISE STRESS TEST
PEAK_HR: 109
PEAK_METS: 2.9

## 2024-05-09 ENCOUNTER — HOSPITAL ENCOUNTER (OUTPATIENT)
Facility: HOSPITAL | Age: 74
Setting detail: RECURRING SERIES
Discharge: HOME | End: 2024-05-12
Payer: MEDICARE

## 2024-05-09 PROCEDURE — 93797 PHYS/QHP OP CAR RHAB WO ECG: CPT

## 2024-05-09 PROCEDURE — 93798 PHYS/QHP OP CAR RHAB W/ECG: CPT

## 2024-05-09 NOTE — CARDIO/PULMONARY
Cardiac Rehab Nutrition Assessment- 1:1 Evaluation  2024      NAME: Mendoza Jiménez : 1950 AGE: 74 y.o.  GENDER: male  CARDIAC REHAB ADMITTING DIAGNOSIS: Stented coronary artery [Z95.5]    PROBLEM LIST:  Patient Active Problem List   Diagnosis    Pneumatosis intestinalis    H/O inguinal hernia repair    CKD (chronic kidney disease) stage 3, GFR 30-59 ml/min (HCC)    Colitis    Gout    Encounter for screening colonoscopy    Colon polyps    SSS (sick sinus syndrome) (HCC)    HTN (hypertension)    GERD (gastroesophageal reflux disease)         LABS:   No results found for: \"HBA1C\", \"IFE6CLPG\"  No results found for: \"CHOL\", \"CHOLPOCT\", \"CHLST\", \"CHOLV\", \"HDL\", \"HDLPOC\", \"HDLC\", \"LDL\", \"VLDLC\", \"VLDL\"      MEDICATIONS/SUPPLEMENTS:   Scheduled Meds:  Continuous Infusions:  PRN Meds:.  Prior to Admission medications    Medication Sig Start Date End Date Taking? Authorizing Provider   budesonide (ENTOCORT EC) 3 MG delayed release capsule Take 1 capsule by mouth in the morning and at bedtime    Provider, MD Sy   acetaminophen (TYLENOL) 500 MG tablet Take 2 tablets by mouth every 6 hours as needed    Automatic Reconciliation, Ar   acyclovir (ZOVIRAX) 400 MG tablet Take 1 tablet by mouth 3 times daily as needed    Automatic Reconciliation, Ar   amLODIPine (NORVASC) 5 MG tablet Take 1 tablet by mouth    Automatic Reconciliation, Ar   ascorbic acid (VITAMIN C) 500 MG tablet Take 2 tablets by mouth daily    Automatic Reconciliation, Ar   ferrous sulfate (IRON 325) 325 (65 Fe) MG tablet Take 1 tablet by mouth every morning (before breakfast)    Automatic Reconciliation, Ar   omeprazole (PRILOSEC) 20 MG delayed release capsule Take 1 capsule by mouth 2 times daily    Automatic Reconciliation, Ar   sildenafil (REVATIO) 20 MG tablet Take 1 tablet by mouth as needed 9/24/15   Automatic Reconciliation, Ar           ANTHROPOMETRICS:    Ht Readings from Last 1 Encounters:   No data found for Ht      Wt

## 2024-05-14 ENCOUNTER — HOSPITAL ENCOUNTER (OUTPATIENT)
Facility: HOSPITAL | Age: 74
Setting detail: RECURRING SERIES
Discharge: HOME | End: 2024-05-17
Payer: MEDICARE

## 2024-05-14 VITALS — BODY MASS INDEX: 25.25 KG/M2 | WEIGHT: 176 LBS

## 2024-05-14 PROCEDURE — 93798 PHYS/QHP OP CAR RHAB W/ECG: CPT

## 2024-05-14 ASSESSMENT — EXERCISE STRESS TEST
PEAK_HR: 106
PEAK_METS: 2.9

## 2024-05-16 ENCOUNTER — HOSPITAL ENCOUNTER (OUTPATIENT)
Facility: HOSPITAL | Age: 74
Setting detail: RECURRING SERIES
Discharge: HOME | End: 2024-05-19
Payer: MEDICARE

## 2024-05-16 VITALS — WEIGHT: 179 LBS | BODY MASS INDEX: 25.68 KG/M2

## 2024-05-16 PROCEDURE — 93798 PHYS/QHP OP CAR RHAB W/ECG: CPT

## 2024-05-16 ASSESSMENT — EXERCISE STRESS TEST
PEAK_METS: 2.9
PEAK_HR: 104
PEAK_RPD: 0

## 2024-05-16 ASSESSMENT — LIFESTYLE VARIABLES: SMOKELESS_TOBACCO: NO

## 2024-05-21 ENCOUNTER — HOSPITAL ENCOUNTER (OUTPATIENT)
Facility: HOSPITAL | Age: 74
Setting detail: RECURRING SERIES
Discharge: HOME | End: 2024-05-24
Payer: MEDICARE

## 2024-05-21 VITALS — BODY MASS INDEX: 25.54 KG/M2 | WEIGHT: 178 LBS

## 2024-05-21 PROCEDURE — 93798 PHYS/QHP OP CAR RHAB W/ECG: CPT

## 2024-05-21 ASSESSMENT — EXERCISE STRESS TEST
PEAK_METS: 2.9
PEAK_HR: 118

## 2024-05-28 ENCOUNTER — HOSPITAL ENCOUNTER (OUTPATIENT)
Facility: HOSPITAL | Age: 74
Setting detail: RECURRING SERIES
Discharge: HOME | End: 2024-05-31
Payer: MEDICARE

## 2024-05-28 VITALS — WEIGHT: 178.6 LBS | BODY MASS INDEX: 25.63 KG/M2

## 2024-05-28 PROCEDURE — 93798 PHYS/QHP OP CAR RHAB W/ECG: CPT

## 2024-05-28 ASSESSMENT — EXERCISE STRESS TEST
PEAK_METS: 2.9
PEAK_HR: 105

## 2024-05-30 ENCOUNTER — HOSPITAL ENCOUNTER (OUTPATIENT)
Facility: HOSPITAL | Age: 74
Setting detail: RECURRING SERIES
End: 2024-05-30
Payer: MEDICARE

## 2024-05-30 VITALS — WEIGHT: 177 LBS | BODY MASS INDEX: 25.4 KG/M2

## 2024-05-30 PROCEDURE — 93798 PHYS/QHP OP CAR RHAB W/ECG: CPT

## 2024-05-30 ASSESSMENT — EXERCISE STRESS TEST
PEAK_HR: 115
PEAK_METS: 2.9

## 2024-06-04 ENCOUNTER — HOSPITAL ENCOUNTER (OUTPATIENT)
Facility: HOSPITAL | Age: 74
Setting detail: RECURRING SERIES
Discharge: HOME | End: 2024-06-07
Payer: MEDICARE

## 2024-06-04 VITALS — WEIGHT: 176.6 LBS | BODY MASS INDEX: 25.34 KG/M2

## 2024-06-04 PROCEDURE — 93798 PHYS/QHP OP CAR RHAB W/ECG: CPT

## 2024-06-04 ASSESSMENT — EXERCISE STRESS TEST
PEAK_HR: 115
PEAK_METS: 2.9

## 2024-06-06 ENCOUNTER — HOSPITAL ENCOUNTER (OUTPATIENT)
Facility: HOSPITAL | Age: 74
Setting detail: RECURRING SERIES
Discharge: HOME | End: 2024-06-09
Payer: MEDICARE

## 2024-06-06 VITALS — WEIGHT: 174.6 LBS | BODY MASS INDEX: 25.05 KG/M2

## 2024-06-06 PROCEDURE — 93798 PHYS/QHP OP CAR RHAB W/ECG: CPT

## 2024-06-06 ASSESSMENT — EXERCISE STRESS TEST
PEAK_HR: 111
PEAK_METS: 2.9

## 2024-06-18 ENCOUNTER — APPOINTMENT (OUTPATIENT)
Facility: HOSPITAL | Age: 74
End: 2024-06-18
Payer: MEDICARE

## 2024-06-20 ENCOUNTER — HOSPITAL ENCOUNTER (OUTPATIENT)
Facility: HOSPITAL | Age: 74
Setting detail: RECURRING SERIES
Discharge: HOME | End: 2024-06-23
Payer: MEDICARE

## 2024-06-20 VITALS — BODY MASS INDEX: 25.66 KG/M2 | WEIGHT: 178.8 LBS

## 2024-06-20 PROCEDURE — 93798 PHYS/QHP OP CAR RHAB W/ECG: CPT

## 2024-06-20 ASSESSMENT — EXERCISE STRESS TEST
PEAK_HR: 112
PEAK_METS: 2.9

## 2024-06-25 ENCOUNTER — HOSPITAL ENCOUNTER (OUTPATIENT)
Facility: HOSPITAL | Age: 74
Setting detail: RECURRING SERIES
Discharge: HOME | End: 2024-06-28
Payer: MEDICARE

## 2024-06-25 VITALS — WEIGHT: 175.6 LBS | BODY MASS INDEX: 25.2 KG/M2

## 2024-06-25 PROCEDURE — 93798 PHYS/QHP OP CAR RHAB W/ECG: CPT

## 2024-06-25 ASSESSMENT — EXERCISE STRESS TEST
PEAK_METS: 2.9
PEAK_HR: 111

## 2024-06-27 ENCOUNTER — HOSPITAL ENCOUNTER (OUTPATIENT)
Facility: HOSPITAL | Age: 74
Setting detail: RECURRING SERIES
Discharge: HOME | End: 2024-06-30
Payer: MEDICARE

## 2024-06-27 VITALS — WEIGHT: 175.8 LBS | BODY MASS INDEX: 25.22 KG/M2

## 2024-06-27 PROCEDURE — 93798 PHYS/QHP OP CAR RHAB W/ECG: CPT

## 2024-06-27 ASSESSMENT — EXERCISE STRESS TEST
PEAK_HR: 112
PEAK_METS: 3.3

## 2024-07-09 ENCOUNTER — HOSPITAL ENCOUNTER (OUTPATIENT)
Facility: HOSPITAL | Age: 74
Setting detail: RECURRING SERIES
Discharge: HOME | End: 2024-07-12
Payer: MEDICARE

## 2024-07-09 VITALS — WEIGHT: 176.6 LBS | BODY MASS INDEX: 25.34 KG/M2

## 2024-07-09 PROCEDURE — 93798 PHYS/QHP OP CAR RHAB W/ECG: CPT

## 2024-07-09 ASSESSMENT — EXERCISE STRESS TEST
PEAK_METS: 3.3
PEAK_HR: 113

## 2024-07-11 ENCOUNTER — HOSPITAL ENCOUNTER (OUTPATIENT)
Facility: HOSPITAL | Age: 74
Setting detail: RECURRING SERIES
Discharge: HOME | End: 2024-07-14
Payer: MEDICARE

## 2024-07-11 VITALS — WEIGHT: 178.6 LBS | BODY MASS INDEX: 25.63 KG/M2

## 2024-07-11 PROCEDURE — 93798 PHYS/QHP OP CAR RHAB W/ECG: CPT

## 2024-07-11 ASSESSMENT — EXERCISE STRESS TEST
PEAK_HR: 122
PEAK_METS: 3.3

## 2024-07-16 ENCOUNTER — OFFICE VISIT (OUTPATIENT)
Age: 74
End: 2024-07-16
Payer: MEDICARE

## 2024-07-16 ENCOUNTER — HOSPITAL ENCOUNTER (OUTPATIENT)
Facility: HOSPITAL | Age: 74
Setting detail: RECURRING SERIES
Discharge: HOME | End: 2024-07-19
Payer: MEDICARE

## 2024-07-16 VITALS — BODY MASS INDEX: 25.54 KG/M2 | WEIGHT: 178 LBS

## 2024-07-16 VITALS
WEIGHT: 175 LBS | HEIGHT: 70 IN | TEMPERATURE: 97.8 F | SYSTOLIC BLOOD PRESSURE: 137 MMHG | DIASTOLIC BLOOD PRESSURE: 81 MMHG | BODY MASS INDEX: 25.05 KG/M2 | OXYGEN SATURATION: 95 % | HEART RATE: 84 BPM

## 2024-07-16 DIAGNOSIS — K40.90 UNILATERAL INGUINAL HERNIA WITHOUT OBSTRUCTION OR GANGRENE, RECURRENCE NOT SPECIFIED: Primary | ICD-10-CM

## 2024-07-16 PROCEDURE — 3079F DIAST BP 80-89 MM HG: CPT | Performed by: SURGERY

## 2024-07-16 PROCEDURE — 99204 OFFICE O/P NEW MOD 45 MIN: CPT | Performed by: SURGERY

## 2024-07-16 PROCEDURE — 3075F SYST BP GE 130 - 139MM HG: CPT | Performed by: SURGERY

## 2024-07-16 PROCEDURE — 1123F ACP DISCUSS/DSCN MKR DOCD: CPT | Performed by: SURGERY

## 2024-07-16 PROCEDURE — 93798 PHYS/QHP OP CAR RHAB W/ECG: CPT

## 2024-07-16 RX ORDER — NITROGLYCERIN 0.4 MG/1
0.4 TABLET SUBLINGUAL EVERY 5 MIN PRN
COMMUNITY
Start: 2024-03-19

## 2024-07-16 RX ORDER — CLOTRIMAZOLE AND BETAMETHASONE DIPROPIONATE 10; .64 MG/G; MG/G
CREAM TOPICAL 2 TIMES DAILY
COMMUNITY

## 2024-07-16 RX ORDER — ROSUVASTATIN CALCIUM 20 MG/1
1 TABLET, COATED ORAL DAILY
COMMUNITY
Start: 2023-10-18

## 2024-07-16 RX ORDER — ALBUTEROL SULFATE 90 UG/1
1 AEROSOL, METERED RESPIRATORY (INHALATION) EVERY 4 HOURS PRN
COMMUNITY

## 2024-07-16 RX ORDER — TRAZODONE HYDROCHLORIDE 50 MG/1
50 TABLET ORAL NIGHTLY
COMMUNITY

## 2024-07-16 RX ORDER — ASPIRIN 81 MG/1
81 TABLET, CHEWABLE ORAL DAILY
COMMUNITY

## 2024-07-16 RX ORDER — CLOPIDOGREL BISULFATE 75 MG/1
75 TABLET ORAL DAILY
COMMUNITY
Start: 2024-03-19 | End: 2025-03-19

## 2024-07-16 RX ORDER — USTEKINUMAB 90 MG/ML
90 INJECTION, SOLUTION SUBCUTANEOUS
COMMUNITY

## 2024-07-16 ASSESSMENT — EXERCISE STRESS TEST
PEAK_HR: 111
PEAK_METS: 3.3

## 2024-07-16 ASSESSMENT — PATIENT HEALTH QUESTIONNAIRE - PHQ9
1. LITTLE INTEREST OR PLEASURE IN DOING THINGS: NOT AT ALL
SUM OF ALL RESPONSES TO PHQ QUESTIONS 1-9: 0
SUM OF ALL RESPONSES TO PHQ QUESTIONS 1-9: 0
SUM OF ALL RESPONSES TO PHQ9 QUESTIONS 1 & 2: 0
SUM OF ALL RESPONSES TO PHQ QUESTIONS 1-9: 0
SUM OF ALL RESPONSES TO PHQ QUESTIONS 1-9: 0
2. FEELING DOWN, DEPRESSED OR HOPELESS: NOT AT ALL
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
## 2024-07-16 NOTE — PROGRESS NOTES
Identified pt with two pt identifiers (name and ). Reviewed chart in preparation for visit and have obtained necessary documentation.    Mendoza Jiménez is a 74 y.o. male  Chief Complaint   Patient presents with    Inguinal Hernia     Seen at the request of Dr. Rai evaluate possible RIH     /81 (Site: Left Upper Arm, Position: Sitting, Cuff Size: Large Adult)   Pulse 84   Temp 97.8 °F (36.6 °C) (Oral)   Ht 1.778 m (5' 10\")   Wt 79.4 kg (175 lb)   SpO2 95%   BMI 25.11 kg/m²     1. Have you been to the ER, urgent care clinic since your last visit?  Hospitalized since your last visit?no    2. Have you seen or consulted any other health care providers outside of the [facility] since your last visit?  Include any pap smears or colon screening. no

## 2024-07-18 ENCOUNTER — HOSPITAL ENCOUNTER (OUTPATIENT)
Facility: HOSPITAL | Age: 74
Setting detail: RECURRING SERIES
Discharge: HOME | End: 2024-07-21
Payer: MEDICARE

## 2024-07-18 VITALS — WEIGHT: 178 LBS | BODY MASS INDEX: 25.54 KG/M2

## 2024-07-18 PROCEDURE — 93798 PHYS/QHP OP CAR RHAB W/ECG: CPT

## 2024-07-30 ENCOUNTER — HOSPITAL ENCOUNTER (OUTPATIENT)
Facility: HOSPITAL | Age: 74
Setting detail: RECURRING SERIES
Discharge: HOME | End: 2024-08-02
Payer: MEDICARE

## 2024-07-30 VITALS — WEIGHT: 178 LBS | BODY MASS INDEX: 25.54 KG/M2

## 2024-07-30 PROCEDURE — 93798 PHYS/QHP OP CAR RHAB W/ECG: CPT

## 2024-07-30 ASSESSMENT — EXERCISE STRESS TEST
PEAK_METS: 3.3
PEAK_HR: 114

## 2024-08-01 ENCOUNTER — HOSPITAL ENCOUNTER (OUTPATIENT)
Facility: HOSPITAL | Age: 74
Setting detail: RECURRING SERIES
Discharge: HOME | End: 2024-08-04
Payer: MEDICARE

## 2024-08-01 VITALS — BODY MASS INDEX: 25.68 KG/M2 | WEIGHT: 179 LBS

## 2024-08-01 PROCEDURE — 93798 PHYS/QHP OP CAR RHAB W/ECG: CPT

## 2024-08-01 ASSESSMENT — EXERCISE STRESS TEST
PEAK_HR: 120
PEAK_METS: 3.3

## 2024-08-08 ENCOUNTER — HOSPITAL ENCOUNTER (OUTPATIENT)
Facility: HOSPITAL | Age: 74
Setting detail: RECURRING SERIES
Discharge: HOME | End: 2024-08-11
Payer: MEDICARE

## 2024-08-08 VITALS — BODY MASS INDEX: 25.4 KG/M2 | WEIGHT: 177 LBS

## 2024-08-08 PROCEDURE — 93798 PHYS/QHP OP CAR RHAB W/ECG: CPT

## 2024-08-08 ASSESSMENT — EXERCISE STRESS TEST
PEAK_METS: 3.3
PEAK_HR: 123
PEAK_RPD: 0

## 2024-08-08 ASSESSMENT — LIFESTYLE VARIABLES: SMOKELESS_TOBACCO: NO

## 2024-08-13 ENCOUNTER — HOSPITAL ENCOUNTER (OUTPATIENT)
Facility: HOSPITAL | Age: 74
Setting detail: RECURRING SERIES
Discharge: HOME | End: 2024-08-16
Payer: MEDICARE

## 2024-08-13 VITALS — BODY MASS INDEX: 25.11 KG/M2 | WEIGHT: 175 LBS

## 2024-08-13 PROCEDURE — 93798 PHYS/QHP OP CAR RHAB W/ECG: CPT

## 2024-08-13 ASSESSMENT — EXERCISE STRESS TEST
PEAK_HR: 114
PEAK_METS: 3.7

## 2024-08-15 ENCOUNTER — HOSPITAL ENCOUNTER (OUTPATIENT)
Facility: HOSPITAL | Age: 74
Setting detail: RECURRING SERIES
Discharge: HOME | End: 2024-08-18
Payer: MEDICARE

## 2024-08-15 VITALS — WEIGHT: 175 LBS | BODY MASS INDEX: 25.11 KG/M2

## 2024-08-15 PROCEDURE — 93798 PHYS/QHP OP CAR RHAB W/ECG: CPT

## 2024-08-15 ASSESSMENT — EXERCISE STRESS TEST
PEAK_HR: 122
PEAK_METS: 4

## 2024-08-20 ENCOUNTER — HOSPITAL ENCOUNTER (OUTPATIENT)
Facility: HOSPITAL | Age: 74
Setting detail: RECURRING SERIES
Discharge: HOME | End: 2024-08-23
Payer: MEDICARE

## 2024-08-20 VITALS — WEIGHT: 177 LBS | BODY MASS INDEX: 25.4 KG/M2

## 2024-08-20 PROCEDURE — 93798 PHYS/QHP OP CAR RHAB W/ECG: CPT

## 2024-08-20 ASSESSMENT — EXERCISE STRESS TEST
PEAK_HR: 120
PEAK_METS: 4

## 2024-08-27 ENCOUNTER — HOSPITAL ENCOUNTER (OUTPATIENT)
Facility: HOSPITAL | Age: 74
Setting detail: RECURRING SERIES
Discharge: HOME | End: 2024-08-30
Payer: MEDICARE

## 2024-08-27 VITALS — BODY MASS INDEX: 25.25 KG/M2 | WEIGHT: 176 LBS

## 2024-08-27 PROCEDURE — 93798 PHYS/QHP OP CAR RHAB W/ECG: CPT

## 2024-08-27 ASSESSMENT — EXERCISE STRESS TEST
PEAK_HR: 116
PEAK_METS: 4

## 2024-08-29 ENCOUNTER — HOSPITAL ENCOUNTER (OUTPATIENT)
Facility: HOSPITAL | Age: 74
Setting detail: RECURRING SERIES
End: 2024-08-29
Payer: MEDICARE

## 2024-08-29 VITALS — WEIGHT: 178 LBS | BODY MASS INDEX: 25.54 KG/M2

## 2024-08-29 PROCEDURE — 93798 PHYS/QHP OP CAR RHAB W/ECG: CPT

## 2024-08-29 ASSESSMENT — EXERCISE STRESS TEST
PEAK_METS: 4
PEAK_HR: 133

## 2024-09-12 ENCOUNTER — HOSPITAL ENCOUNTER (OUTPATIENT)
Facility: HOSPITAL | Age: 74
Setting detail: RECURRING SERIES
Discharge: HOME | End: 2024-09-15
Payer: MEDICARE

## 2024-09-12 VITALS — BODY MASS INDEX: 25.25 KG/M2 | WEIGHT: 176 LBS

## 2024-09-12 PROCEDURE — 93798 PHYS/QHP OP CAR RHAB W/ECG: CPT

## 2024-09-12 ASSESSMENT — EXERCISE STRESS TEST
PEAK_HR: 116
PEAK_RPE: 14
PEAK_METS: 4

## 2024-09-24 ENCOUNTER — HOSPITAL ENCOUNTER (OUTPATIENT)
Facility: HOSPITAL | Age: 74
Setting detail: RECURRING SERIES
Discharge: HOME | End: 2024-09-27
Payer: MEDICARE

## 2024-09-24 VITALS — BODY MASS INDEX: 24.97 KG/M2 | WEIGHT: 174 LBS

## 2024-09-24 PROCEDURE — 93798 PHYS/QHP OP CAR RHAB W/ECG: CPT

## 2024-09-24 ASSESSMENT — EXERCISE STRESS TEST
PEAK_RPE: 14
PEAK_HR: 111
PEAK_METS: 4

## 2024-09-26 ENCOUNTER — HOSPITAL ENCOUNTER (OUTPATIENT)
Facility: HOSPITAL | Age: 74
Setting detail: RECURRING SERIES
Discharge: HOME | End: 2024-09-29
Payer: MEDICARE

## 2024-09-26 VITALS — WEIGHT: 175 LBS | BODY MASS INDEX: 25.11 KG/M2

## 2024-09-26 PROCEDURE — 93798 PHYS/QHP OP CAR RHAB W/ECG: CPT

## 2024-09-26 ASSESSMENT — EXERCISE STRESS TEST
PEAK_RPE: 14
PEAK_HR: 114
PEAK_METS: 4

## 2024-10-10 ENCOUNTER — HOSPITAL ENCOUNTER (OUTPATIENT)
Facility: HOSPITAL | Age: 74
Setting detail: RECURRING SERIES
Discharge: HOME | End: 2024-10-13
Payer: MEDICARE

## 2024-10-10 VITALS — BODY MASS INDEX: 25.25 KG/M2 | WEIGHT: 176 LBS

## 2024-10-10 PROCEDURE — 93798 PHYS/QHP OP CAR RHAB W/ECG: CPT

## 2024-10-10 ASSESSMENT — EXERCISE STRESS TEST
PEAK_HR: 124
PEAK_METS: 4
PEAK_RPE: 14

## 2024-10-15 ENCOUNTER — HOSPITAL ENCOUNTER (OUTPATIENT)
Facility: HOSPITAL | Age: 74
Setting detail: RECURRING SERIES
Discharge: HOME | End: 2024-10-18
Payer: MEDICARE

## 2024-10-15 ENCOUNTER — TELEPHONE (OUTPATIENT)
Age: 74
End: 2024-10-15

## 2024-10-15 VITALS — WEIGHT: 177 LBS | BODY MASS INDEX: 25.4 KG/M2

## 2024-10-15 PROCEDURE — 93798 PHYS/QHP OP CAR RHAB W/ECG: CPT

## 2024-10-15 ASSESSMENT — EXERCISE STRESS TEST
PEAK_HR: 120
PEAK_METS: 4
PEAK_RPE: 14

## 2024-10-15 NOTE — TELEPHONE ENCOUNTER
[facility] cardiology called to give fax number for cardiac clearance form fax to     C/b    Attn triage nurse

## 2024-10-15 NOTE — TELEPHONE ENCOUNTER
I recommended to Pt to call cardiologist since this he is trying to get this done around the same time frame as colonoscopy.  Pt will call today.

## 2024-10-15 NOTE — TELEPHONE ENCOUNTER
Pt has colonoscopy scheduled 10/28/24.  He states he can be off his blood thinners for that.  I re-faxed cardiac clearance form 10/15/2024.  Pt informed we do need clearance from cardiologist.

## 2024-10-15 NOTE — TELEPHONE ENCOUNTER
Patient is here to proceed with scheduling surgery for hernia but since his last OV was on 7/16/2024 I wanted confirmation if he needs to be seen for an updated office visit or is he fine to call and schedule surgery. His colonoscopy is October the 28th.       Best callback number is [phone]

## 2024-10-17 ENCOUNTER — HOSPITAL ENCOUNTER (OUTPATIENT)
Facility: HOSPITAL | Age: 74
Setting detail: RECURRING SERIES
Discharge: HOME | End: 2024-10-20
Payer: MEDICARE

## 2024-10-17 VITALS — WEIGHT: 178 LBS | BODY MASS INDEX: 25.54 KG/M2

## 2024-10-17 PROCEDURE — 93798 PHYS/QHP OP CAR RHAB W/ECG: CPT

## 2024-10-17 ASSESSMENT — EXERCISE STRESS TEST
PEAK_RPE: 14
PEAK_METS: 4
PEAK_HR: 131

## 2024-10-22 ENCOUNTER — HOSPITAL ENCOUNTER (OUTPATIENT)
Facility: HOSPITAL | Age: 74
Setting detail: RECURRING SERIES
Discharge: HOME | End: 2024-10-25
Payer: MEDICARE

## 2024-10-22 VITALS — WEIGHT: 178 LBS | BODY MASS INDEX: 25.54 KG/M2

## 2024-10-22 PROCEDURE — 93798 PHYS/QHP OP CAR RHAB W/ECG: CPT

## 2024-10-22 ASSESSMENT — EXERCISE STRESS TEST
PEAK_RPE: 14
PEAK_METS: 4
PEAK_HR: 135

## 2024-10-23 ENCOUNTER — TELEPHONE (OUTPATIENT)
Age: 74
End: 2024-10-23

## 2024-10-23 ASSESSMENT — PATIENT HEALTH QUESTIONNAIRE - PHQ9
1. LITTLE INTEREST OR PLEASURE IN DOING THINGS: NEARLY EVERY DAY
5. POOR APPETITE OR OVEREATING: NOT AT ALL
3. TROUBLE FALLING OR STAYING ASLEEP: SEVERAL DAYS
SUM OF ALL RESPONSES TO PHQ QUESTIONS 1-9: 4
7. TROUBLE CONCENTRATING ON THINGS, SUCH AS READING THE NEWSPAPER OR WATCHING TELEVISION: NOT AT ALL
SUM OF ALL RESPONSES TO PHQ QUESTIONS 1-9: 4
9. THOUGHTS THAT YOU WOULD BE BETTER OFF DEAD, OR OF HURTING YOURSELF: NOT AT ALL
6. FEELING BAD ABOUT YOURSELF - OR THAT YOU ARE A FAILURE OR HAVE LET YOURSELF OR YOUR FAMILY DOWN: NOT AT ALL
4. FEELING TIRED OR HAVING LITTLE ENERGY: NOT AT ALL
10. IF YOU CHECKED OFF ANY PROBLEMS, HOW DIFFICULT HAVE THESE PROBLEMS MADE IT FOR YOU TO DO YOUR WORK, TAKE CARE OF THINGS AT HOME, OR GET ALONG WITH OTHER PEOPLE: NOT DIFFICULT AT ALL
8. MOVING OR SPEAKING SO SLOWLY THAT OTHER PEOPLE COULD HAVE NOTICED. OR THE OPPOSITE, BEING SO FIGETY OR RESTLESS THAT YOU HAVE BEEN MOVING AROUND A LOT MORE THAN USUAL: NOT AT ALL
SUM OF ALL RESPONSES TO PHQ QUESTIONS 1-9: 4
2. FEELING DOWN, DEPRESSED OR HOPELESS: NOT AT ALL
SUM OF ALL RESPONSES TO PHQ QUESTIONS 1-9: 4
SUM OF ALL RESPONSES TO PHQ9 QUESTIONS 1 & 2: 3

## 2024-10-23 NOTE — TELEPHONE ENCOUNTER
Patient requesting c/b to see if we received cardiac clearance and doctor reviewed is wanting to schedule surgery    [phone]

## 2024-10-24 ENCOUNTER — HOSPITAL ENCOUNTER (OUTPATIENT)
Facility: HOSPITAL | Age: 74
Setting detail: RECURRING SERIES
Discharge: HOME | End: 2024-10-27
Payer: MEDICARE

## 2024-10-24 VITALS — BODY MASS INDEX: 25.11 KG/M2 | WEIGHT: 175 LBS

## 2024-10-24 PROCEDURE — 93798 PHYS/QHP OP CAR RHAB W/ECG: CPT

## 2024-10-24 ASSESSMENT — PATIENT HEALTH QUESTIONNAIRE - PHQ9
1. LITTLE INTEREST OR PLEASURE IN DOING THINGS: NEARLY EVERY DAY
8. MOVING OR SPEAKING SO SLOWLY THAT OTHER PEOPLE COULD HAVE NOTICED. OR THE OPPOSITE, BEING SO FIGETY OR RESTLESS THAT YOU HAVE BEEN MOVING AROUND A LOT MORE THAN USUAL: NOT AT ALL
9. THOUGHTS THAT YOU WOULD BE BETTER OFF DEAD, OR OF HURTING YOURSELF: NOT AT ALL
3. TROUBLE FALLING OR STAYING ASLEEP: SEVERAL DAYS
6. FEELING BAD ABOUT YOURSELF - OR THAT YOU ARE A FAILURE OR HAVE LET YOURSELF OR YOUR FAMILY DOWN: NOT AT ALL
SUM OF ALL RESPONSES TO PHQ QUESTIONS 1-9: 4
SUM OF ALL RESPONSES TO PHQ QUESTIONS 1-9: 4
5. POOR APPETITE OR OVEREATING: NOT AT ALL
4. FEELING TIRED OR HAVING LITTLE ENERGY: NOT AT ALL
2. FEELING DOWN, DEPRESSED OR HOPELESS: NOT AT ALL
SUM OF ALL RESPONSES TO PHQ9 QUESTIONS 1 & 2: 3
SUM OF ALL RESPONSES TO PHQ QUESTIONS 1-9: 4
10. IF YOU CHECKED OFF ANY PROBLEMS, HOW DIFFICULT HAVE THESE PROBLEMS MADE IT FOR YOU TO DO YOUR WORK, TAKE CARE OF THINGS AT HOME, OR GET ALONG WITH OTHER PEOPLE: NOT DIFFICULT AT ALL
7. TROUBLE CONCENTRATING ON THINGS, SUCH AS READING THE NEWSPAPER OR WATCHING TELEVISION: NOT AT ALL
SUM OF ALL RESPONSES TO PHQ QUESTIONS 1-9: 4

## 2024-10-24 ASSESSMENT — EXERCISE STRESS TEST
PEAK_HR: 118
PEAK_METS: 4.4
PEAK_RPE: 14
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
## 2024-10-29 ENCOUNTER — APPOINTMENT (OUTPATIENT)
Facility: HOSPITAL | Age: 74
End: 2024-10-29
Payer: MEDICARE

## 2024-10-31 ENCOUNTER — APPOINTMENT (OUTPATIENT)
Facility: HOSPITAL | Age: 74
End: 2024-10-31
Payer: MEDICARE

## 2024-11-05 ENCOUNTER — TELEPHONE (OUTPATIENT)
Age: 74
End: 2024-11-05

## 2024-11-05 NOTE — TELEPHONE ENCOUNTER
Patient called and is wanting to know if we received cardiac clearance please call    [phone]

## 2024-11-08 ENCOUNTER — TELEPHONE (OUTPATIENT)
Age: 74
End: 2024-11-08

## 2024-11-08 NOTE — TELEPHONE ENCOUNTER
Patient calling to see if we could fax a cardiac clearance form to his cardiologist today. Cardiologist is Dr. Jacquelyn Hendricks, fax number [phone], [facility].     Please call patient if there are any questions.

## 2024-11-08 NOTE — TELEPHONE ENCOUNTER
Cardiac clear form faxed to Jacquelyn Hendricks [phone], attn Genevieve per patient request. Confirmation received. Patient aware.

## 2024-11-13 ENCOUNTER — PREP FOR PROCEDURE (OUTPATIENT)
Age: 74
End: 2024-11-13

## 2024-11-13 DIAGNOSIS — K40.90 RIGHT INGUINAL HERNIA: ICD-10-CM

## 2024-12-04 ENCOUNTER — TELEPHONE (OUTPATIENT)
Age: 74
End: 2024-12-04

## 2024-12-04 NOTE — TELEPHONE ENCOUNTER
Patient called requesting to speak to nurse on recovery time and what kind of surgery he is having on 12.13.24    [phone]

## 2024-12-05 ENCOUNTER — TELEPHONE (OUTPATIENT)
Age: 74
End: 2024-12-05

## 2025-01-06 NOTE — PROGRESS NOTES
Instructed patient to check with surgeon and prescribing physician for the Tremfya infusion that he is due to have on 1/9/25 to make sure it is ok to have prior to surgery. Patient verbalized understanding.  
[facility]  Preoperative Instructions        Surgery Date 1/13/25          Time of Arrival to be called on 1/10/25 between 2-5pm to [phone]     On the day of your surgery, please report to Surgical Services Registration Desk and sign in at your designated time.  The Surgery Center is located to the right of the Emergency Room.     2. You must have someone with you to drive you home. You should not drive a car for 24 hours following surgery. Please make arrangements for a friend or family member to stay with you for the first 24 hours after your surgery.    3. Do not have anything to eat or drink (including water, gum, mints, coffee, juice) after midnight 1/12. This may not apply to medications prescribed by your physician. ?(Please note below the special instructions with medications to take the morning of your procedure.)    4. We recommend you do not drink any alcoholic beverages for 24 hours before and after your surgery.    5. Contact your surgeon's office for instructions on the following medications: non-steroidal anti-inflammatory drugs (i.e. Advil, Aleve), vitamins, and supplements. (Some surgeon's will want you to stop these medications prior to surgery and others may allow you to take them)  **If you are currently taking Plavix, Coumadin, Aspirin and/or other blood-thinning agents, contact your surgeon for instructions.** Your surgeon will partner with the physician prescribing these medications to determine if it is safe to stop or if you need to continue taking.  Please do not stop taking these medications without instructions from your surgeon    6. Wear comfortable clothes.  Wear glasses instead of contacts.  Do not bring any money or jewelry. Please bring picture ID, insurance card, and any prearranged co-payment or hospital payment.  Do not wear make-up, particularly mascara the morning of your surgery.  Do not wear nail polish, particularly if you are having foot /hand 
healing and prevent you from healing completely.    If you lose your Hibiclens/chlorhexidine, please call the Surgery Center, or it is available for purchase at your pharmacy.               ___________________      ___________________      ________________  (Signature of Patient)          (Witness)                   (Date and Time)

## 2025-01-10 ENCOUNTER — ANESTHESIA EVENT (OUTPATIENT)
Facility: HOSPITAL | Age: 75
End: 2025-01-10
Payer: MEDICARE

## 2025-01-10 NOTE — ANESTHESIA PRE PROCEDURE
ascorbic acid (VITAMIN C) 500 MG tablet Take 2 tablets by mouth daily    Automatic Reconciliation, Ar   ferrous sulfate (IRON 325) 325 (65 Fe) MG tablet Take 1 tablet by mouth every morning (before breakfast)    Automatic Reconciliation, Ar   omeprazole (PRILOSEC) 20 MG delayed release capsule Take 1 capsule by mouth Daily    Automatic Reconciliation, Ar   sildenafil (REVATIO) 20 MG tablet Take 1 tablet by mouth as needed 9/24/15   Automatic Reconciliation, Ar       Current medications:    No current facility-administered medications for this encounter.     Current Outpatient Medications   Medication Sig Dispense Refill   • Guselkumab (TREMFYA SC) Inject into the skin every 30 days Due for infusion 1/9/25 then starts monthly injections in Feb.     • albuterol sulfate HFA (PROVENTIL;VENTOLIN;PROAIR) 108 (90 Base) MCG/ACT inhaler Inhale 1 puff into the lungs every 4 hours as needed for Shortness of Breath     • aspirin 81 MG chewable tablet Take 1 tablet by mouth daily     • clopidogrel (PLAVIX) 75 MG tablet Take 1 tablet by mouth daily     • rosuvastatin (CRESTOR) 20 MG tablet Take 1 tablet by mouth daily     • nitroGLYCERIN (NITROSTAT) 0.4 MG SL tablet Place 1 tablet under the tongue every 5 minutes as needed for Chest pain     • clotrimazole-betamethasone (LOTRISONE) 1-0.05 % cream Apply topically 2 times daily     • traZODone (DESYREL) 50 MG tablet Take 1 tablet by mouth nightly     • budesonide (ENTOCORT EC) 3 MG delayed release capsule Take 1 capsule by mouth in the morning and at bedtime     • acetaminophen (TYLENOL) 500 MG tablet Take 2 tablets by mouth every 6 hours as needed     • acyclovir (ZOVIRAX) 400 MG tablet Take 1 tablet by mouth 3 times daily as needed     • amLODIPine (NORVASC) 5 MG tablet Take 1 tablet by mouth daily     • ascorbic acid (VITAMIN C) 500 MG tablet Take 2 tablets by mouth daily     • ferrous sulfate (IRON 325) 325 (65 Fe) MG tablet Take 1 tablet by mouth every morning (before

## 2025-01-13 ENCOUNTER — HOSPITAL ENCOUNTER (OUTPATIENT)
Facility: HOSPITAL | Age: 75
Setting detail: OUTPATIENT SURGERY
Discharge: HOME | End: 2025-01-13
Attending: SURGERY | Admitting: SURGERY
Payer: MEDICARE

## 2025-01-13 ENCOUNTER — ANESTHESIA (OUTPATIENT)
Facility: HOSPITAL | Age: 75
End: 2025-01-13
Payer: MEDICARE

## 2025-01-13 VITALS
SYSTOLIC BLOOD PRESSURE: 122 MMHG | WEIGHT: 172 LBS | OXYGEN SATURATION: 94 % | DIASTOLIC BLOOD PRESSURE: 64 MMHG | HEIGHT: 70 IN | HEART RATE: 88 BPM | BODY MASS INDEX: 24.62 KG/M2 | RESPIRATION RATE: 14 BRPM | TEMPERATURE: 98.4 F

## 2025-01-13 DIAGNOSIS — K40.90 RIGHT INGUINAL HERNIA: Primary | ICD-10-CM

## 2025-01-13 PROCEDURE — 6360000002 HC RX W HCPCS: Performed by: ANESTHESIOLOGY

## 2025-01-13 PROCEDURE — 3600000019 HC SURGERY ROBOT ADDTL 15MIN: Performed by: SURGERY

## 2025-01-13 PROCEDURE — 7100000000 HC PACU RECOVERY - FIRST 15 MIN: Performed by: SURGERY

## 2025-01-13 PROCEDURE — 49650 LAP ING HERNIA REPAIR INIT: CPT | Performed by: SURGERY

## 2025-01-13 PROCEDURE — 7100000011 HC PHASE II RECOVERY - ADDTL 15 MIN: Performed by: SURGERY

## 2025-01-13 PROCEDURE — S2900 ROBOTIC SURGICAL SYSTEM: HCPCS | Performed by: SURGERY

## 2025-01-13 PROCEDURE — 6360000002 HC RX W HCPCS: Performed by: SURGERY

## 2025-01-13 PROCEDURE — 7100000001 HC PACU RECOVERY - ADDTL 15 MIN: Performed by: SURGERY

## 2025-01-13 PROCEDURE — 6360000002 HC RX W HCPCS: Performed by: NURSE ANESTHETIST, CERTIFIED REGISTERED

## 2025-01-13 PROCEDURE — 6370000000 HC RX 637 (ALT 250 FOR IP): Performed by: SURGERY

## 2025-01-13 PROCEDURE — 2500000003 HC RX 250 WO HCPCS: Performed by: NURSE ANESTHETIST, CERTIFIED REGISTERED

## 2025-01-13 PROCEDURE — 3700000001 HC ADD 15 MINUTES (ANESTHESIA): Performed by: SURGERY

## 2025-01-13 PROCEDURE — 3700000000 HC ANESTHESIA ATTENDED CARE: Performed by: SURGERY

## 2025-01-13 PROCEDURE — 2580000003 HC RX 258: Performed by: NURSE ANESTHETIST, CERTIFIED REGISTERED

## 2025-01-13 PROCEDURE — C1781 MESH (IMPLANTABLE): HCPCS | Performed by: SURGERY

## 2025-01-13 PROCEDURE — 3600000009 HC SURGERY ROBOT BASE: Performed by: SURGERY

## 2025-01-13 PROCEDURE — 2709999900 HC NON-CHARGEABLE SUPPLY: Performed by: SURGERY

## 2025-01-13 PROCEDURE — 7100000010 HC PHASE II RECOVERY - FIRST 15 MIN: Performed by: SURGERY

## 2025-01-13 DEVICE — BARD SOFT MESH, 6" X 6" (15 CM X 15 CM)
Type: IMPLANTABLE DEVICE | Site: ABDOMEN | Status: FUNCTIONAL
Brand: BARD

## 2025-01-13 RX ORDER — OXYCODONE HYDROCHLORIDE 5 MG/1
5 TABLET ORAL EVERY 6 HOURS PRN
Qty: 12 TABLET | Refills: 0 | Status: SHIPPED | OUTPATIENT
Start: 2025-01-13 | End: 2025-01-16

## 2025-01-13 RX ORDER — HYDROMORPHONE HYDROCHLORIDE 1 MG/ML
0.25 INJECTION, SOLUTION INTRAMUSCULAR; INTRAVENOUS; SUBCUTANEOUS EVERY 5 MIN PRN
Status: DISCONTINUED | OUTPATIENT
Start: 2025-01-13 | End: 2025-01-14 | Stop reason: HOSPADM

## 2025-01-13 RX ORDER — LIDOCAINE HYDROCHLORIDE 20 MG/ML
INJECTION, SOLUTION EPIDURAL; INFILTRATION; INTRACAUDAL; PERINEURAL
Status: DISCONTINUED | OUTPATIENT
Start: 2025-01-13 | End: 2025-01-13 | Stop reason: SDUPTHER

## 2025-01-13 RX ORDER — ONDANSETRON 2 MG/ML
INJECTION INTRAMUSCULAR; INTRAVENOUS
Status: DISCONTINUED | OUTPATIENT
Start: 2025-01-13 | End: 2025-01-13 | Stop reason: SDUPTHER

## 2025-01-13 RX ORDER — OXYCODONE HYDROCHLORIDE 5 MG/1
5 TABLET ORAL ONCE
Status: COMPLETED | OUTPATIENT
Start: 2025-01-13 | End: 2025-01-13

## 2025-01-13 RX ORDER — BUPIVACAINE HYDROCHLORIDE 5 MG/ML
INJECTION, SOLUTION PERINEURAL PRN
Status: DISCONTINUED | OUTPATIENT
Start: 2025-01-13 | End: 2025-01-13 | Stop reason: ALTCHOICE

## 2025-01-13 RX ORDER — PROCHLORPERAZINE EDISYLATE 5 MG/ML
5 INJECTION INTRAMUSCULAR; INTRAVENOUS
Status: COMPLETED | OUTPATIENT
Start: 2025-01-13 | End: 2025-01-13

## 2025-01-13 RX ORDER — SODIUM CHLORIDE, SODIUM LACTATE, POTASSIUM CHLORIDE, CALCIUM CHLORIDE 600; 310; 30; 20 MG/100ML; MG/100ML; MG/100ML; MG/100ML
INJECTION, SOLUTION INTRAVENOUS
Status: DISCONTINUED | OUTPATIENT
Start: 2025-01-13 | End: 2025-01-13 | Stop reason: SDUPTHER

## 2025-01-13 RX ORDER — SODIUM CHLORIDE 0.9 % (FLUSH) 0.9 %
5-40 SYRINGE (ML) INJECTION EVERY 12 HOURS SCHEDULED
Status: DISCONTINUED | OUTPATIENT
Start: 2025-01-13 | End: 2025-01-13 | Stop reason: HOSPADM

## 2025-01-13 RX ORDER — SODIUM CHLORIDE 9 MG/ML
INJECTION, SOLUTION INTRAVENOUS PRN
Status: DISCONTINUED | OUTPATIENT
Start: 2025-01-13 | End: 2025-01-13 | Stop reason: HOSPADM

## 2025-01-13 RX ORDER — PROPOFOL 10 MG/ML
INJECTION, EMULSION INTRAVENOUS
Status: DISCONTINUED | OUTPATIENT
Start: 2025-01-13 | End: 2025-01-13 | Stop reason: SDUPTHER

## 2025-01-13 RX ORDER — PROCHLORPERAZINE EDISYLATE 5 MG/ML
5 INJECTION INTRAMUSCULAR; INTRAVENOUS
Status: DISCONTINUED | OUTPATIENT
Start: 2025-01-13 | End: 2025-01-14 | Stop reason: HOSPADM

## 2025-01-13 RX ORDER — DEXAMETHASONE SODIUM PHOSPHATE 4 MG/ML
INJECTION, SOLUTION INTRA-ARTICULAR; INTRALESIONAL; INTRAMUSCULAR; INTRAVENOUS; SOFT TISSUE
Status: DISCONTINUED | OUTPATIENT
Start: 2025-01-13 | End: 2025-01-13 | Stop reason: SDUPTHER

## 2025-01-13 RX ORDER — FENTANYL CITRATE 50 UG/ML
INJECTION, SOLUTION INTRAMUSCULAR; INTRAVENOUS
Status: DISCONTINUED | OUTPATIENT
Start: 2025-01-13 | End: 2025-01-13 | Stop reason: SDUPTHER

## 2025-01-13 RX ORDER — SODIUM CHLORIDE 0.9 % (FLUSH) 0.9 %
5-40 SYRINGE (ML) INJECTION EVERY 12 HOURS SCHEDULED
Status: DISCONTINUED | OUTPATIENT
Start: 2025-01-13 | End: 2025-01-14 | Stop reason: HOSPADM

## 2025-01-13 RX ORDER — CEFAZOLIN SODIUM/WATER 2 G/20 ML
SYRINGE (ML) INTRAVENOUS
Status: DISCONTINUED | OUTPATIENT
Start: 2025-01-13 | End: 2025-01-13 | Stop reason: SDUPTHER

## 2025-01-13 RX ORDER — SODIUM CHLORIDE 9 MG/ML
INJECTION, SOLUTION INTRAVENOUS PRN
Status: DISCONTINUED | OUTPATIENT
Start: 2025-01-13 | End: 2025-01-14 | Stop reason: HOSPADM

## 2025-01-13 RX ORDER — SODIUM CHLORIDE 0.9 % (FLUSH) 0.9 %
5-40 SYRINGE (ML) INJECTION PRN
Status: DISCONTINUED | OUTPATIENT
Start: 2025-01-13 | End: 2025-01-13 | Stop reason: HOSPADM

## 2025-01-13 RX ORDER — SODIUM CHLORIDE, SODIUM LACTATE, POTASSIUM CHLORIDE, CALCIUM CHLORIDE 600; 310; 30; 20 MG/100ML; MG/100ML; MG/100ML; MG/100ML
INJECTION, SOLUTION INTRAVENOUS CONTINUOUS
Status: DISCONTINUED | OUTPATIENT
Start: 2025-01-13 | End: 2025-01-13 | Stop reason: HOSPADM

## 2025-01-13 RX ORDER — ROCURONIUM BROMIDE 10 MG/ML
INJECTION, SOLUTION INTRAVENOUS
Status: DISCONTINUED | OUTPATIENT
Start: 2025-01-13 | End: 2025-01-13 | Stop reason: SDUPTHER

## 2025-01-13 RX ORDER — NALOXONE HYDROCHLORIDE 0.4 MG/ML
INJECTION, SOLUTION INTRAMUSCULAR; INTRAVENOUS; SUBCUTANEOUS PRN
Status: DISCONTINUED | OUTPATIENT
Start: 2025-01-13 | End: 2025-01-14 | Stop reason: HOSPADM

## 2025-01-13 RX ORDER — SODIUM CHLORIDE 0.9 % (FLUSH) 0.9 %
5-40 SYRINGE (ML) INJECTION PRN
Status: DISCONTINUED | OUTPATIENT
Start: 2025-01-13 | End: 2025-01-14 | Stop reason: HOSPADM

## 2025-01-13 RX ORDER — FENTANYL CITRATE 50 UG/ML
50 INJECTION, SOLUTION INTRAMUSCULAR; INTRAVENOUS EVERY 5 MIN PRN
Status: COMPLETED | OUTPATIENT
Start: 2025-01-13 | End: 2025-01-13

## 2025-01-13 RX ADMIN — SODIUM CHLORIDE, POTASSIUM CHLORIDE, SODIUM LACTATE AND CALCIUM CHLORIDE: 600; 310; 30; 20 INJECTION, SOLUTION INTRAVENOUS at 10:47

## 2025-01-13 RX ADMIN — FENTANYL CITRATE 25 MCG: 50 INJECTION, SOLUTION INTRAMUSCULAR; INTRAVENOUS at 10:47

## 2025-01-13 RX ADMIN — PHENYLEPHRINE HYDROCHLORIDE 50 MCG/MIN: 10 INJECTION INTRAVENOUS at 11:05

## 2025-01-13 RX ADMIN — FENTANYL CITRATE 75 MCG: 50 INJECTION, SOLUTION INTRAMUSCULAR; INTRAVENOUS at 10:50

## 2025-01-13 RX ADMIN — PROPOFOL 50 MG: 10 INJECTION, EMULSION INTRAVENOUS at 11:36

## 2025-01-13 RX ADMIN — Medication 2 G: at 11:05

## 2025-01-13 RX ADMIN — DEXAMETHASONE SODIUM PHOSPHATE 4 MG: 4 INJECTION, SOLUTION INTRAMUSCULAR; INTRAVENOUS at 11:12

## 2025-01-13 RX ADMIN — SODIUM CHLORIDE, POTASSIUM CHLORIDE, SODIUM LACTATE AND CALCIUM CHLORIDE: 600; 310; 30; 20 INJECTION, SOLUTION INTRAVENOUS at 12:02

## 2025-01-13 RX ADMIN — ROCURONIUM BROMIDE 5 MG: 10 INJECTION INTRAVENOUS at 10:54

## 2025-01-13 RX ADMIN — SUGAMMADEX 200 MG: 100 INJECTION, SOLUTION INTRAVENOUS at 11:57

## 2025-01-13 RX ADMIN — HYDROMORPHONE HYDROCHLORIDE 0.5 MG: 1 INJECTION, SOLUTION INTRAMUSCULAR; INTRAVENOUS; SUBCUTANEOUS at 11:28

## 2025-01-13 RX ADMIN — ONDANSETRON HYDROCHLORIDE 4 MG: 2 INJECTION, SOLUTION INTRAMUSCULAR; INTRAVENOUS at 11:57

## 2025-01-13 RX ADMIN — PROCHLORPERAZINE EDISYLATE 2.5 MG: 5 INJECTION INTRAMUSCULAR; INTRAVENOUS at 13:00

## 2025-01-13 RX ADMIN — FENTANYL CITRATE 50 MCG: 50 INJECTION, SOLUTION INTRAMUSCULAR; INTRAVENOUS at 13:10

## 2025-01-13 RX ADMIN — OXYCODONE 5 MG: 5 TABLET ORAL at 13:10

## 2025-01-13 RX ADMIN — ROCURONIUM BROMIDE 10 MG: 10 INJECTION INTRAVENOUS at 11:35

## 2025-01-13 RX ADMIN — FENTANYL CITRATE 50 MCG: 50 INJECTION, SOLUTION INTRAMUSCULAR; INTRAVENOUS at 13:05

## 2025-01-13 RX ADMIN — LIDOCAINE HYDROCHLORIDE 100 MG: 20 INJECTION, SOLUTION EPIDURAL; INFILTRATION; INTRACAUDAL; PERINEURAL at 10:50

## 2025-01-13 RX ADMIN — PROPOFOL 150 MG: 10 INJECTION, EMULSION INTRAVENOUS at 10:54

## 2025-01-13 RX ADMIN — ROCURONIUM BROMIDE 45 MG: 10 INJECTION INTRAVENOUS at 10:55

## 2025-01-13 ASSESSMENT — PAIN DESCRIPTION - LOCATION
LOCATION: ABDOMEN

## 2025-01-13 ASSESSMENT — PAIN SCALES - GENERAL
PAINLEVEL_OUTOF10: 3
PAINLEVEL_OUTOF10: 0
PAINLEVEL_OUTOF10: 5
PAINLEVEL_OUTOF10: 5

## 2025-01-13 ASSESSMENT — PAIN DESCRIPTION - PAIN TYPE: TYPE: SURGICAL PAIN

## 2025-01-13 ASSESSMENT — PAIN DESCRIPTION - ORIENTATION
ORIENTATION: ANTERIOR
ORIENTATION: ANTERIOR

## 2025-01-13 ASSESSMENT — PAIN DESCRIPTION - DESCRIPTORS
DESCRIPTORS: SORE
DESCRIPTORS: SORE
DESCRIPTORS: SORE;ACHING

## 2025-01-13 NOTE — ANESTHESIA POSTPROCEDURE EVALUATION
Department of Anesthesiology  Postprocedure Note    Patient: Mendoza Jiménez  MRN: [id number]  YOB: 1950  Date of evaluation: 1/13/2025    Procedure Summary       Date: 01/13/25 Room / Location: [facility]    Anesthesia Start: 1047 Anesthesia Stop: 1212    Procedure: ROBOTIC RIGHT INGUINAL HERNIA REPAIR WITH MESH (Abdomen) Diagnosis:       Right inguinal hernia      (Right inguinal hernia [K40.90])    Providers: Nicole Ohara MD Responsible Provider: Kodak Vang MD    Anesthesia Type: General ASA Status: 3            Anesthesia Type: General    Joanne Phase I: Joanne Score: 9    Joanne Phase II: Joanne Score: 10    Anesthesia Post Evaluation  Post-Anesthesia Evaluation and Assessment    Patient: Mendoza Jiménez MRN: [id number]  SSN: [id number]    YOB: 1950  Age: 74 y.o.  Sex: male      I have evaluated the patient and they are stable and ready for discharge from the PACU.     Cardiovascular Function/Vital Signs  Visit Vitals  /76   Pulse 88   Temp 98.4 °F (36.9 °C)   Resp 11   Ht 1.778 m (5' 10\")   Wt 78 kg (172 lb)   SpO2 93%   BMI 24.68 kg/m²       Patient is status post General anesthesia for Procedure(s):  ROBOTIC RIGHT INGUINAL HERNIA REPAIR WITH MESH.    Nausea/Vomiting: None    Postoperative hydration reviewed and adequate.    Pain:      Managed    Neurological Status:       At baseline    Mental Status, Level of Consciousness: Alert and  oriented to person, place, and time    Pulmonary Status:       Adequate oxygenation and airway patent    Complications related to anesthesia: None    Post-anesthesia assessment completed. No concerns    Signed By: Kodak Vang MD     January 13, 2025                No notable events documented.

## 2025-01-13 NOTE — FLOWSHEET NOTE
01/13/25 1210   Handoff   Communication Given Transfer Handoff   Handoff Given To KIESHA CLAUDIO   Handoff Received From DANICA NELSON RN AND CATHY WILLOUGHBY CRNA   Handoff Communication Face to Face;At bedside     1345 Report given to Dafne CLAUDIO.

## 2025-01-13 NOTE — DISCHARGE INSTRUCTIONS
skin.  Avoid strenuous activities, such as biking, jogging, weight lifting, or aerobic exercise, until your doctor says it is okay.  Avoid lifting anything that would make you strain. This may include heavy grocery bags and milk containers, a heavy briefcase or backpack, cat litter or dog food bags, a child, or a vacuum .  You may drive when you are no longer taking pain medicine and can quickly move your foot from the gas pedal to the brake. You must also be able to sit comfortably for a long period of time.  Most people are able to return to work within 1 to 2 weeks after surgery.  You may shower. Pat the cut (incision) dry. Do not take a bath for 2 weeks.  You can have sex again when it feels comfortable to do so.    Diet  You can eat your normal diet. If your stomach is upset, try bland, low-fat foods like plain rice, broiled chicken, toast, and yogurt.  Drink plenty of fluids (unless your doctor tells you not to).  You may notice that your bowel movements are not regular right after your surgery. This is common. Avoid constipation and straining with bowel movements. You may want to take a fiber supplement every day. If you have not had a bowel movement after a couple of days, ask your doctor about taking a mild laxative.    Medicines  Take pain medicines exactly as directed.  If the doctor gave you a prescription medicine for pain, take it as prescribed.  If you are not taking a prescription pain medicine, take an over-the-counter medicine such as acetaminophen (Tylenol), ibuprofen (Advil, Motrin), or naproxen (Aleve). Read and follow all instructions on the label.  Do not take two or more pain medicines at the same time unless the doctor told you to. Many pain medicines have acetaminophen, which is Tylenol. Too much acetaminophen (Tylenol) can be harmful.  If your doctor prescribed antibiotics, take them as directed. Do not stop taking them just because you feel better. You need to take the full course

## 2025-01-13 NOTE — OP NOTE
Operative Note      Patient: Mendoza Jiménez  YOB: 1950  MRN: [id number]    Date of Procedure: 1/13/2025    Pre-Op Diagnosis Codes:      * Right inguinal hernia [K40.90]    Post-Op Diagnosis: Post-Op Diagnosis Codes:     * Right inguinal hernia [K40.90]       Procedure(s):  ROBOTIC RIGHT INGUINAL HERNIA REPAIR WITH MESH    Surgeon(s):  Nicole Ohara MD    Assistant:   Circulator: Lianet Kwok RN  First Assistant: Liddle, Gerald, RN  Scrub Person First: Anastasiia Ellis    Anesthesia: General    Estimated Blood Loss (mL): Minimal    Complications: None    Specimens:   * No specimens in log *    Implants:  Implant Name Type Inv. Item Serial No.  Lot No. LRB No. Used Action   MESH FLORENCE [id number] POLYPR SQ L PORE MFIL SFT KNIT - SNA  MESH FLORENCE [id number] POLYPR SQ L PORE MFIL SFT KNIT NA BARD DAVOL-WD [id number] Right 1 Implanted         Drains:   Urinary Catheter 01/13/25 [employer] (Active)       Findings:moderate right indirect inguinal hernia    INDICATION FOR PROCEDURE: Mendoza Jiménez is a 74 y.o. male presented to the office with groin pain.  On exam, he was noted to have a hernia. He is brought to the operating room for repair. Risks, benefits and alternatives were discussed. Consent form was placed in the chart.    DESCRIPTION OF PROCEDURE: The patient was brought to operating room number 1, with consent form signed and on the chart and the site of surgery marked. After satisfactory induction of general anesthesia, the patient was kept in the supine position, bilateral arms tucked to his sides, with appropriate padding. The patient's abdomen was prepped and draped sterilely, including use of Ioban.    After appropriate time-out was held, the skin was infused with local anesthetic, an incision was made at the umbilicus. A 5-mm optical entry trocar was placed intra-abdominally under visualization. Pneumoperitoneum was achieved to 15 mmHg. 8-mm robotic port was placed on the

## 2025-01-13 NOTE — H&P
Bilateral Inguinal Hernia Repair with Mesh, Robot Assisted.  Risks,benefits, and alternatives were discussed including the risk of anesthesia, bleeding, infection, including mesh infection, chronic orchialgia, neuralgia, other pain syndromes, testicular ischemia, conversion to open, injury to bowel, and recurrence were discussed.

## 2025-01-28 ENCOUNTER — OFFICE VISIT (OUTPATIENT)
Age: 75
End: 2025-01-28

## 2025-01-28 VITALS
WEIGHT: 174 LBS | SYSTOLIC BLOOD PRESSURE: 115 MMHG | DIASTOLIC BLOOD PRESSURE: 74 MMHG | OXYGEN SATURATION: 94 % | TEMPERATURE: 97.7 F | HEIGHT: 70 IN | BODY MASS INDEX: 24.91 KG/M2 | HEART RATE: 87 BPM | RESPIRATION RATE: 18 BRPM

## 2025-01-28 DIAGNOSIS — K40.90 RIGHT INGUINAL HERNIA: Primary | ICD-10-CM

## 2025-01-28 PROCEDURE — 99024 POSTOP FOLLOW-UP VISIT: CPT | Performed by: SURGERY

## 2025-01-28 ASSESSMENT — PATIENT HEALTH QUESTIONNAIRE - PHQ9
SUM OF ALL RESPONSES TO PHQ9 QUESTIONS 1 & 2: 0
SUM OF ALL RESPONSES TO PHQ QUESTIONS 1-9: 0
2. FEELING DOWN, DEPRESSED OR HOPELESS: NOT AT ALL
SUM OF ALL RESPONSES TO PHQ QUESTIONS 1-9: 0
SUM OF ALL RESPONSES TO PHQ QUESTIONS 1-9: 0
1. LITTLE INTEREST OR PLEASURE IN DOING THINGS: NOT AT ALL
SUM OF ALL RESPONSES TO PHQ QUESTIONS 1-9: 0

## 2025-01-28 NOTE — PROGRESS NOTES
SUBJECTIVE: Mendoza Jiménez is a 74 y.o. male is seen for a routine postop check s/p robotic right inguinal hernia repair with mesh. Reports no problems with the wound or other issues.  Activity, diet and bowels are normal. No pain. No fevers    OBJECTIVE:   Vitals:    01/28/25 0905   BP: 115/74   Pulse: 87   Resp: 18   Temp: 97.7 °F (36.5 °C)   SpO2: 94%       General: Appears well.   Incision: healing well, no drainage, no erythema, no seroma, incision well approximated, no swelling    ASSESSMENT: normal postoperative course, doing well.    PLAN:   Wound care discussed  No heavy lifting for 6 weeks  Follow up PRN

## 2025-01-28 NOTE — PROGRESS NOTES
Identified pt with two pt identifiers (name and ). Reviewed chart in preparation for visit and have obtained necessary documentation.    Mendoza Jiménez is a 74 y.o. male  Chief Complaint   Patient presents with    Follow-up     S/P 2025 robotic right ING hernia repair     /74 (Site: Right Upper Arm, Position: Sitting, Cuff Size: Small Adult)   Pulse 87   Temp 97.7 °F (36.5 °C) (Temporal)   Resp 18   Ht 1.778 m (5' 10\")   Wt 78.9 kg (174 lb)   SpO2 94%   BMI 24.97 kg/m²     1. Have you been to the ER, urgent care clinic since your last visit?  Hospitalized since your last visit?no    2. Have you seen or consulted any other health care providers outside of the [facility] since your last visit?  Include any pap smears or colon screening. no

## 2025-06-17 ENCOUNTER — HOSPITAL ENCOUNTER (OUTPATIENT)
Facility: HOSPITAL | Age: 75
Discharge: HOME | End: 2025-06-20
Payer: MEDICARE

## 2025-06-17 ENCOUNTER — TRANSCRIBE ORDERS (OUTPATIENT)
Facility: HOSPITAL | Age: 75
End: 2025-06-17

## 2025-06-17 DIAGNOSIS — R22.42 LOCALIZED SWELLING OF LEFT LOWER LEG: Primary | ICD-10-CM

## 2025-06-17 DIAGNOSIS — R22.42 LOCALIZED SWELLING OF LEFT LOWER LEG: ICD-10-CM

## 2025-06-17 PROCEDURE — 93971 EXTREMITY STUDY: CPT

## (undated) DEVICE — TOWEL,OR,DSP,ST,BLUE,STD,4/PK,20PK/CS: Brand: MEDLINE

## (undated) DEVICE — SUTURE MONOCRYL SZ 4-0 L27IN ABSRB UD L19MM PS-2 1/2 CIR PRIM Y426H

## (undated) DEVICE — SYSTEM EVAC SMOKE LAPARSCOPIC

## (undated) DEVICE — SYR 10ML LUER LOK 1/5ML GRAD --

## (undated) DEVICE — Device

## (undated) DEVICE — BASIN EMSIS 16OZ GRAPHITE PLAS KID SHP MOLD GRAD FOR ORAL

## (undated) DEVICE — BLOCK BITE ENDOSCP AD 21 MM W/ DIL BLU LF DISP

## (undated) DEVICE — 3M™ TEGADERM™ TRANSPARENT FILM DRESSING FRAME STYLE, 1624W, 2-3/8 IN X 2-3/4 IN (6 CM X 7 CM), 100/CT 4CT/CASE: Brand: 3M™ TEGADERM™

## (undated) DEVICE — 1200 GUARD II KIT W/5MM TUBE W/O VAC TUBE: Brand: GUARDIAN

## (undated) DEVICE — TOWEL 4 PLY TISS 19X30 SUE WHT

## (undated) DEVICE — SOLUTION LACTATED RINGERS INJECTION USP

## (undated) DEVICE — SOLIDIFIER MEDC 1200ML -- CONVERT TO 356117

## (undated) DEVICE — BAG SPEC BIOHZRD 10 X 10 IN --

## (undated) DEVICE — NEONATAL-ADULT SPO2 SENSOR: Brand: NELLCOR

## (undated) DEVICE — SYR 3ML LL TIP 1/10ML GRAD --

## (undated) DEVICE — GENERAL LAPAROSCOPY-MRMC: Brand: MEDLINE INDUSTRIES, INC.

## (undated) DEVICE — BLADELESS OBTURATOR: Brand: WECK VISTA

## (undated) DEVICE — SOLUTION ANTIFOG VIS SYS CLEARIFY LAPSCP

## (undated) DEVICE — TROCAR LAP L100MM DIA5MM BLDELSS W/ STBL SL ENDOPATH XCEL

## (undated) DEVICE — CONTAINER SPEC 20 ML LID NEUT BUFF FORMALIN 10 % POLYPR STS

## (undated) DEVICE — KENDALL DL ECG CABLE AND LEAD WIRE SYSTEM, 3-LEAD, SINGLE PATIENT USE: Brand: KENDALL

## (undated) DEVICE — HYPODERMIC SAFETY NEEDLE: Brand: MAGELLAN

## (undated) DEVICE — PAD PT POS 36 IN SURGYPAD DISP

## (undated) DEVICE — COLUMN DRAPE

## (undated) DEVICE — GOWN,SIRUS,NONRNF,SETINSLV,XL,20/CS: Brand: MEDLINE

## (undated) DEVICE — SUTURE VICRYL + SZ 3-0 L27IN ABSRB UD L26MM SH 1/2 CIR VCP416H

## (undated) DEVICE — SUTURE VICRYL + SZ 0 L27IN ABSRB VLT L26MM UR-6 5/8 CIR VCP603H

## (undated) DEVICE — INSTA-GARD PROCEDURE MASK, YELLOW: Brand: CONVERTORS

## (undated) DEVICE — STERILE POLYISOPRENE POWDER-FREE SURGICAL GLOVES: Brand: PROTEXIS

## (undated) DEVICE — TIP COVER ACCESSORY

## (undated) DEVICE — SET ADMIN 16ML TBNG L100IN 2 Y INJ SITE IV PIGGY BK DISP

## (undated) DEVICE — (D)SYR 10ML 1/5ML GRAD NSAF -- PKGING CHANGE USE ITEM 338027

## (undated) DEVICE — FORCEPS BX L240CM JAW DIA2.8MM L CAP W/ NDL MIC MESH TOOTH

## (undated) DEVICE — CONTINU-FLO SOLUTION SET, 2 INJECTION SITES, MALE LUER LOCK ADAPTER WITH RETRACTABLE COLLAR, LARGE BORE STOPCOCK WITH ROTATING MALE LUER LOCK EXTENSION SET, 2 INJECTION SITES, MALE LUER LOCK ADAPTER WITH RETRACTABLE COLLAR: Brand: INTERLINK/CONTINU-FLO

## (undated) DEVICE — BLADE CLIPPER GEN PURP NS

## (undated) DEVICE — SOLIDIFIER FLD 2OZ 1500CC N DISINF IN BTL DISP SAFESORB

## (undated) DEVICE — 3M™ IOBAN™ 2 ANTIMICROBIAL INCISE DRAPE 6650EZ: Brand: IOBAN™ 2

## (undated) DEVICE — ARM DRAPE

## (undated) DEVICE — SEAL

## (undated) DEVICE — CATH IV AUTOGRD BC PNK 20GA 25 -- INSYTE

## (undated) DEVICE — FORCEPS BX L160CM DIA8MM GRSP DISECT CUP TIP NONLOCKING ROT

## (undated) DEVICE — SUTURE STRATAFIX SPRL PDS + VLT 3-0 15CM DISPOSABLE/SNGLE SXPP1B420

## (undated) DEVICE — Z DISCONTINUED PER MEDLINE LINE GAS SAMPLING O2/CO2 LNG AD 13 FT NSL W/ TBNG FILTERLINE

## (undated) DEVICE — SUTURE PDS II SZ 2-0 L27IN ABSRB VLT SH L26MM 1/2 CIR Z317H

## (undated) DEVICE — ENDO CARRY-ON PROCEDURE KIT INCLUDES ENZYMATIC SPONGE, GAUZE, BIOHAZARD LABEL, TRAY, LUBRICANT, DIRTY SCOPE LABEL, WATER LABEL, TRAY, DRAWSTRING PAD, AND DEFENDO 4-PIECE KIT.: Brand: ENDO CARRY-ON PROCEDURE KIT

## (undated) DEVICE — ELECTRODE,RADIOTRANSLUCENT,FOAM,5PK: Brand: MEDLINE

## (undated) DEVICE — LIQUIBAND RAPID ADHESIVE 36/CS 0.8ML: Brand: MEDLINE

## (undated) DEVICE — COVER,MAYO STAND,STERILE: Brand: MEDLINE

## (undated) DEVICE — SOLUTION IRRIG 1000ML STRL H2O USP PLAS POUR BTL

## (undated) DEVICE — YANKAUER,TAPERED BULBOUS TIP,W/O VENT: Brand: MEDLINE